# Patient Record
Sex: FEMALE | Race: ASIAN | NOT HISPANIC OR LATINO | Employment: FULL TIME | URBAN - METROPOLITAN AREA
[De-identification: names, ages, dates, MRNs, and addresses within clinical notes are randomized per-mention and may not be internally consistent; named-entity substitution may affect disease eponyms.]

---

## 2017-01-03 ENCOUNTER — AMBULATORY - HEALTHEAST (OUTPATIENT)
Dept: FAMILY MEDICINE | Facility: CLINIC | Age: 52
End: 2017-01-03

## 2017-01-03 DIAGNOSIS — R80.9 MICROALBUMINURIA: ICD-10-CM

## 2017-03-13 ENCOUNTER — OFFICE VISIT - HEALTHEAST (OUTPATIENT)
Dept: FAMILY MEDICINE | Facility: CLINIC | Age: 52
End: 2017-03-13

## 2017-03-13 DIAGNOSIS — E11.9 DIABETES (H): ICD-10-CM

## 2017-03-13 DIAGNOSIS — E11.9 DIABETES MELLITUS (H): ICD-10-CM

## 2017-03-13 DIAGNOSIS — M54.50 LUMBAGO: ICD-10-CM

## 2017-03-13 DIAGNOSIS — E55.9 UNSPECIFIED VITAMIN D DEFICIENCY: ICD-10-CM

## 2017-03-13 DIAGNOSIS — D75.839 THROMBOCYTOSIS: ICD-10-CM

## 2017-03-13 DIAGNOSIS — R20.2 TINGLING IN EXTREMITIES: ICD-10-CM

## 2017-03-13 DIAGNOSIS — R80.9 MICROALBUMINURIA: ICD-10-CM

## 2017-03-13 LAB — HBA1C MFR BLD: 6.2 % (ref 3.5–6)

## 2017-03-13 ASSESSMENT — MIFFLIN-ST. JEOR: SCORE: 1187.48

## 2017-03-22 ENCOUNTER — RECORDS - HEALTHEAST (OUTPATIENT)
Dept: ADMINISTRATIVE | Facility: OTHER | Age: 52
End: 2017-03-22

## 2017-06-22 ENCOUNTER — AMBULATORY - HEALTHEAST (OUTPATIENT)
Dept: LAB | Facility: CLINIC | Age: 52
End: 2017-06-22

## 2017-06-22 DIAGNOSIS — E11.9 DIABETES (H): ICD-10-CM

## 2017-06-22 DIAGNOSIS — R80.9 MICROALBUMINURIA: ICD-10-CM

## 2017-06-22 DIAGNOSIS — E11.9 DIABETES MELLITUS (H): ICD-10-CM

## 2017-06-22 LAB
CHOLEST SERPL-MCNC: 209 MG/DL
FASTING STATUS PATIENT QL REPORTED: NO
HBA1C MFR BLD: 7.6 % (ref 3.5–6)
HDLC SERPL-MCNC: 31 MG/DL
LDLC SERPL CALC-MCNC: 72 MG/DL
LDLC SERPL CALC-MCNC: ABNORMAL MG/DL
TRIGL SERPL-MCNC: 820 MG/DL

## 2017-06-29 ENCOUNTER — OFFICE VISIT - HEALTHEAST (OUTPATIENT)
Dept: FAMILY MEDICINE | Facility: CLINIC | Age: 52
End: 2017-06-29

## 2017-06-29 DIAGNOSIS — E55.9 UNSPECIFIED VITAMIN D DEFICIENCY: ICD-10-CM

## 2017-06-29 DIAGNOSIS — E11.9 DIABETES (H): ICD-10-CM

## 2017-06-29 DIAGNOSIS — R80.9 MICROALBUMINURIA: ICD-10-CM

## 2017-06-29 ASSESSMENT — MIFFLIN-ST. JEOR: SCORE: 1159.47

## 2017-08-31 ENCOUNTER — OFFICE VISIT - HEALTHEAST (OUTPATIENT)
Dept: FAMILY MEDICINE | Facility: CLINIC | Age: 52
End: 2017-08-31

## 2017-08-31 DIAGNOSIS — Z23 NEED FOR IMMUNIZATION AGAINST INFLUENZA: ICD-10-CM

## 2017-08-31 DIAGNOSIS — R20.2 TINGLING IN EXTREMITIES: ICD-10-CM

## 2017-08-31 DIAGNOSIS — I83.90 VARICOSE VEIN OF LEG: ICD-10-CM

## 2017-08-31 DIAGNOSIS — E11.9 DIABETES MELLITUS (H): ICD-10-CM

## 2017-08-31 DIAGNOSIS — E55.9 UNSPECIFIED VITAMIN D DEFICIENCY: ICD-10-CM

## 2017-08-31 DIAGNOSIS — R51.9 HEADACHE: ICD-10-CM

## 2017-08-31 DIAGNOSIS — Z00.00 ANNUAL PHYSICAL EXAM: ICD-10-CM

## 2017-08-31 ASSESSMENT — MIFFLIN-ST. JEOR: SCORE: 1161.29

## 2017-09-06 ENCOUNTER — AMBULATORY - HEALTHEAST (OUTPATIENT)
Dept: EDUCATION SERVICES | Facility: CLINIC | Age: 52
End: 2017-09-06

## 2017-09-06 DIAGNOSIS — E11.9 CONTROLLED TYPE 2 DIABETES MELLITUS WITHOUT COMPLICATION, WITHOUT LONG-TERM CURRENT USE OF INSULIN (H): ICD-10-CM

## 2017-10-09 ENCOUNTER — OFFICE VISIT - HEALTHEAST (OUTPATIENT)
Dept: FAMILY MEDICINE | Facility: CLINIC | Age: 52
End: 2017-10-09

## 2017-10-09 DIAGNOSIS — E55.9 VITAMIN D DEFICIENCY: ICD-10-CM

## 2017-10-09 DIAGNOSIS — E11.9 DIABETES MELLITUS (H): ICD-10-CM

## 2017-10-09 LAB — HBA1C MFR BLD: 7.9 % (ref 3.5–6)

## 2017-10-09 ASSESSMENT — MIFFLIN-ST. JEOR: SCORE: 1158.56

## 2017-10-10 ENCOUNTER — AMBULATORY - HEALTHEAST (OUTPATIENT)
Dept: FAMILY MEDICINE | Facility: CLINIC | Age: 52
End: 2017-10-10

## 2017-10-10 DIAGNOSIS — E55.9 VITAMIN D DEFICIENCY: ICD-10-CM

## 2017-10-11 ENCOUNTER — COMMUNICATION - HEALTHEAST (OUTPATIENT)
Dept: FAMILY MEDICINE | Facility: CLINIC | Age: 52
End: 2017-10-11

## 2017-10-16 ENCOUNTER — COMMUNICATION - HEALTHEAST (OUTPATIENT)
Dept: FAMILY MEDICINE | Facility: CLINIC | Age: 52
End: 2017-10-16

## 2018-01-11 ENCOUNTER — OFFICE VISIT - HEALTHEAST (OUTPATIENT)
Dept: FAMILY MEDICINE | Facility: CLINIC | Age: 53
End: 2018-01-11

## 2018-01-11 DIAGNOSIS — R51.9 HEADACHE: ICD-10-CM

## 2018-01-11 DIAGNOSIS — I10 HTN (HYPERTENSION): ICD-10-CM

## 2018-01-11 DIAGNOSIS — L85.3 DRY SKIN: ICD-10-CM

## 2018-01-11 DIAGNOSIS — E55.9 VITAMIN D DEFICIENCY: ICD-10-CM

## 2018-01-11 DIAGNOSIS — E11.9 DIABETES (H): ICD-10-CM

## 2018-01-11 LAB
ALBUMIN SERPL-MCNC: 4.3 G/DL (ref 3.5–5)
ALP SERPL-CCNC: 54 U/L (ref 45–120)
ALT SERPL W P-5'-P-CCNC: 30 U/L (ref 0–45)
ANION GAP SERPL CALCULATED.3IONS-SCNC: 14 MMOL/L (ref 5–18)
AST SERPL W P-5'-P-CCNC: 19 U/L (ref 0–40)
BILIRUB SERPL-MCNC: 1 MG/DL (ref 0–1)
BUN SERPL-MCNC: 20 MG/DL (ref 8–22)
CALCIUM SERPL-MCNC: 10.2 MG/DL (ref 8.5–10.5)
CHLORIDE BLD-SCNC: 102 MMOL/L (ref 98–107)
CO2 SERPL-SCNC: 23 MMOL/L (ref 22–31)
CREAT SERPL-MCNC: 0.77 MG/DL (ref 0.6–1.1)
GFR SERPL CREATININE-BSD FRML MDRD: >60 ML/MIN/1.73M2
GLUCOSE BLD-MCNC: 181 MG/DL (ref 70–125)
HBA1C MFR BLD: 6.8 % (ref 3.5–6)
POTASSIUM BLD-SCNC: 4.2 MMOL/L (ref 3.5–5)
PROT SERPL-MCNC: 7.9 G/DL (ref 6–8)
SODIUM SERPL-SCNC: 139 MMOL/L (ref 136–145)

## 2018-01-11 ASSESSMENT — MIFFLIN-ST. JEOR: SCORE: 1152.67

## 2018-01-12 LAB — 25(OH)D3 SERPL-MCNC: 45.7 NG/ML (ref 30–80)

## 2018-02-15 ENCOUNTER — OFFICE VISIT - HEALTHEAST (OUTPATIENT)
Dept: NURSING | Facility: CLINIC | Age: 53
End: 2018-02-15

## 2018-02-15 DIAGNOSIS — E55.9 VITAMIN D DEFICIENCY: ICD-10-CM

## 2018-02-15 DIAGNOSIS — E11.9 TYPE 2 DIABETES MELLITUS WITHOUT COMPLICATION, WITHOUT LONG-TERM CURRENT USE OF INSULIN (H): ICD-10-CM

## 2018-02-15 DIAGNOSIS — I10 ESSENTIAL HYPERTENSION WITH GOAL BLOOD PRESSURE LESS THAN 140/90: ICD-10-CM

## 2018-04-25 ENCOUNTER — OFFICE VISIT - HEALTHEAST (OUTPATIENT)
Dept: FAMILY MEDICINE | Facility: CLINIC | Age: 53
End: 2018-04-25

## 2018-04-25 DIAGNOSIS — M54.50 ACUTE LEFT-SIDED LOW BACK PAIN WITHOUT SCIATICA: ICD-10-CM

## 2018-04-25 DIAGNOSIS — R30.0 DYSURIA: ICD-10-CM

## 2018-04-25 LAB
ALBUMIN UR-MCNC: NEGATIVE MG/DL
APPEARANCE UR: CLEAR
BILIRUB UR QL STRIP: NEGATIVE
COLOR UR AUTO: YELLOW
GLUCOSE UR STRIP-MCNC: NEGATIVE MG/DL
HGB UR QL STRIP: NEGATIVE
KETONES UR STRIP-MCNC: NEGATIVE MG/DL
LEUKOCYTE ESTERASE UR QL STRIP: NEGATIVE
NITRATE UR QL: NEGATIVE
PH UR STRIP: 5.5 [PH] (ref 5–8)
SP GR UR STRIP: 1.02 (ref 1–1.03)
UROBILINOGEN UR STRIP-ACNC: NORMAL

## 2018-04-25 ASSESSMENT — MIFFLIN-ST. JEOR: SCORE: 1152.4

## 2018-05-15 ENCOUNTER — OFFICE VISIT - HEALTHEAST (OUTPATIENT)
Dept: FAMILY MEDICINE | Facility: CLINIC | Age: 53
End: 2018-05-15

## 2018-05-15 DIAGNOSIS — R80.9 MICROALBUMINURIA: ICD-10-CM

## 2018-05-15 DIAGNOSIS — Z12.31 VISIT FOR SCREENING MAMMOGRAM: ICD-10-CM

## 2018-05-15 DIAGNOSIS — E08.29 DIABETES MELLITUS DUE TO UNDERLYING CONDITION WITH MICROALBUMINURIA, WITHOUT LONG-TERM CURRENT USE OF INSULIN (H): ICD-10-CM

## 2018-05-15 DIAGNOSIS — T46.4X5A COUGH DUE TO ACE INHIBITOR: ICD-10-CM

## 2018-05-15 DIAGNOSIS — E11.9 DIABETES (H): ICD-10-CM

## 2018-05-15 DIAGNOSIS — R05.8 COUGH DUE TO ACE INHIBITOR: ICD-10-CM

## 2018-05-15 DIAGNOSIS — R80.9 DIABETES MELLITUS DUE TO UNDERLYING CONDITION WITH MICROALBUMINURIA, WITHOUT LONG-TERM CURRENT USE OF INSULIN (H): ICD-10-CM

## 2018-05-15 DIAGNOSIS — I10 HTN (HYPERTENSION): ICD-10-CM

## 2018-05-15 LAB — HBA1C MFR BLD: 7.2 % (ref 3.5–6)

## 2018-05-15 ASSESSMENT — MIFFLIN-ST. JEOR: SCORE: 1152.22

## 2018-06-01 ENCOUNTER — RECORDS - HEALTHEAST (OUTPATIENT)
Dept: ADMINISTRATIVE | Facility: OTHER | Age: 53
End: 2018-06-01

## 2018-08-02 ENCOUNTER — OFFICE VISIT - HEALTHEAST (OUTPATIENT)
Dept: FAMILY MEDICINE | Facility: CLINIC | Age: 53
End: 2018-08-02

## 2018-08-02 ENCOUNTER — RECORDS - HEALTHEAST (OUTPATIENT)
Dept: MAMMOGRAPHY | Facility: CLINIC | Age: 53
End: 2018-08-02

## 2018-08-02 DIAGNOSIS — Z12.31 ENCOUNTER FOR SCREENING MAMMOGRAM FOR MALIGNANT NEOPLASM OF BREAST: ICD-10-CM

## 2018-08-02 DIAGNOSIS — Z00.00 ANNUAL PHYSICAL EXAM: ICD-10-CM

## 2018-08-02 DIAGNOSIS — R05.8 COUGH DUE TO ACE INHIBITOR: ICD-10-CM

## 2018-08-02 DIAGNOSIS — E08.29 DIABETES MELLITUS DUE TO UNDERLYING CONDITION WITH MICROALBUMINURIA, WITHOUT LONG-TERM CURRENT USE OF INSULIN (H): ICD-10-CM

## 2018-08-02 DIAGNOSIS — R80.9 MICROALBUMINURIA: ICD-10-CM

## 2018-08-02 DIAGNOSIS — Z12.4 CERVICAL CANCER SCREENING: ICD-10-CM

## 2018-08-02 DIAGNOSIS — E55.9 VITAMIN D DEFICIENCY: ICD-10-CM

## 2018-08-02 DIAGNOSIS — I10 HTN (HYPERTENSION): ICD-10-CM

## 2018-08-02 DIAGNOSIS — D75.839 THROMBOCYTOSIS: ICD-10-CM

## 2018-08-02 DIAGNOSIS — R80.9 DIABETES MELLITUS DUE TO UNDERLYING CONDITION WITH MICROALBUMINURIA, WITHOUT LONG-TERM CURRENT USE OF INSULIN (H): ICD-10-CM

## 2018-08-02 DIAGNOSIS — T46.4X5A COUGH DUE TO ACE INHIBITOR: ICD-10-CM

## 2018-08-02 DIAGNOSIS — R51.9 HEADACHE: ICD-10-CM

## 2018-08-02 LAB
CHOLEST SERPL-MCNC: 204 MG/DL
CREAT UR-MCNC: 64.1 MG/DL
ERYTHROCYTE [DISTWIDTH] IN BLOOD BY AUTOMATED COUNT: 12.5 % (ref 11–14.5)
FASTING STATUS PATIENT QL REPORTED: NO
HCT VFR BLD AUTO: 46.8 % (ref 35–47)
HDLC SERPL-MCNC: 45 MG/DL
HGB BLD-MCNC: 16.1 G/DL (ref 12–16)
LDLC SERPL CALC-MCNC: 90 MG/DL
MCH RBC QN AUTO: 28.4 PG (ref 27–34)
MCHC RBC AUTO-ENTMCNC: 34.4 G/DL (ref 32–36)
MCV RBC AUTO: 83 FL (ref 80–100)
MICROALBUMIN UR-MCNC: 0.58 MG/DL (ref 0–1.99)
MICROALBUMIN/CREAT UR: 9 MG/G
PLATELET # BLD AUTO: 171 THOU/UL (ref 140–440)
PMV BLD AUTO: 9.1 FL (ref 7–10)
RBC # BLD AUTO: 5.66 MILL/UL (ref 3.8–5.4)
TRIGL SERPL-MCNC: 347 MG/DL
WBC: 5.3 THOU/UL (ref 4–11)

## 2018-08-02 ASSESSMENT — MIFFLIN-ST. JEOR: SCORE: 1154.99

## 2018-08-03 LAB
25(OH)D3 SERPL-MCNC: 21.9 NG/ML (ref 30–80)
HPV SOURCE: NORMAL
HUMAN PAPILLOMA VIRUS 16 DNA: NEGATIVE
HUMAN PAPILLOMA VIRUS 18 DNA: NEGATIVE
HUMAN PAPILLOMA VIRUS FINAL DIAGNOSIS: NORMAL
HUMAN PAPILLOMA VIRUS OTHER HR: NEGATIVE
SPECIMEN DESCRIPTION: NORMAL

## 2018-08-06 ENCOUNTER — AMBULATORY - HEALTHEAST (OUTPATIENT)
Dept: FAMILY MEDICINE | Facility: CLINIC | Age: 53
End: 2018-08-06

## 2018-08-06 DIAGNOSIS — E55.9 VITAMIN D DEFICIENCY: ICD-10-CM

## 2018-08-08 LAB
BKR LAB AP ABNORMAL BLEEDING: NO
BKR LAB AP BIRTH CONTROL/HORMONES: NORMAL
BKR LAB AP CERVICAL APPEARANCE: NORMAL
BKR LAB AP GYN ADEQUACY: NORMAL
BKR LAB AP GYN INTERPRETATION: NORMAL
BKR LAB AP HPV REFLEX: NORMAL
BKR LAB AP LMP: NORMAL
BKR LAB AP PATIENT STATUS: NORMAL
BKR LAB AP PREVIOUS ABNORMAL: NORMAL
BKR LAB AP PREVIOUS NORMAL: 2013
HIGH RISK?: NO
PATH REPORT.COMMENTS IMP SPEC: NORMAL
RESULT FLAG (HE HISTORICAL CONVERSION): NORMAL

## 2018-09-28 ENCOUNTER — COMMUNICATION - HEALTHEAST (OUTPATIENT)
Dept: FAMILY MEDICINE | Facility: CLINIC | Age: 53
End: 2018-09-28

## 2018-09-28 DIAGNOSIS — E11.9 CONTROLLED TYPE 2 DIABETES MELLITUS WITHOUT COMPLICATION, WITHOUT LONG-TERM CURRENT USE OF INSULIN (H): ICD-10-CM

## 2018-10-03 ENCOUNTER — OFFICE VISIT - HEALTHEAST (OUTPATIENT)
Dept: FAMILY MEDICINE | Facility: CLINIC | Age: 53
End: 2018-10-03

## 2018-10-03 DIAGNOSIS — Z23 NEED FOR IMMUNIZATION AGAINST INFLUENZA: ICD-10-CM

## 2018-10-03 DIAGNOSIS — M67.439 GANGLION CYST OF WRIST, UNSPECIFIED LATERALITY: ICD-10-CM

## 2018-10-03 ASSESSMENT — MIFFLIN-ST. JEOR: SCORE: 1168.66

## 2018-11-02 ENCOUNTER — OFFICE VISIT - HEALTHEAST (OUTPATIENT)
Dept: FAMILY MEDICINE | Facility: CLINIC | Age: 53
End: 2018-11-02

## 2018-11-02 DIAGNOSIS — M54.2 NECK PAIN: ICD-10-CM

## 2018-11-02 DIAGNOSIS — M94.0 COSTOCHONDRITIS, ACUTE: ICD-10-CM

## 2018-11-06 ENCOUNTER — OFFICE VISIT - HEALTHEAST (OUTPATIENT)
Dept: FAMILY MEDICINE | Facility: CLINIC | Age: 53
End: 2018-11-06

## 2018-11-06 DIAGNOSIS — I10 HTN (HYPERTENSION): ICD-10-CM

## 2018-11-06 DIAGNOSIS — L85.3 DRY SKIN: ICD-10-CM

## 2018-11-06 DIAGNOSIS — R80.9 MICROALBUMINURIA: ICD-10-CM

## 2018-11-06 DIAGNOSIS — E55.9 VITAMIN D DEFICIENCY: ICD-10-CM

## 2018-11-06 DIAGNOSIS — E11.9 DIABETES (H): ICD-10-CM

## 2018-11-06 DIAGNOSIS — R07.89 CHEST WALL PAIN: ICD-10-CM

## 2018-11-06 LAB — HBA1C MFR BLD: 7.6 % (ref 3.5–6)

## 2018-11-06 ASSESSMENT — MIFFLIN-ST. JEOR: SCORE: 1170.25

## 2018-11-30 ENCOUNTER — COMMUNICATION - HEALTHEAST (OUTPATIENT)
Dept: FAMILY MEDICINE | Facility: CLINIC | Age: 53
End: 2018-11-30

## 2018-11-30 DIAGNOSIS — E11.9 DIABETES MELLITUS (H): ICD-10-CM

## 2019-01-16 ENCOUNTER — COMMUNICATION - HEALTHEAST (OUTPATIENT)
Dept: FAMILY MEDICINE | Facility: CLINIC | Age: 54
End: 2019-01-16

## 2019-01-16 DIAGNOSIS — R51.9 HEADACHE: ICD-10-CM

## 2019-01-25 ENCOUNTER — COMMUNICATION - HEALTHEAST (OUTPATIENT)
Dept: FAMILY MEDICINE | Facility: CLINIC | Age: 54
End: 2019-01-25

## 2019-01-25 DIAGNOSIS — E11.9 CONTROLLED TYPE 2 DIABETES MELLITUS WITHOUT COMPLICATION, WITHOUT LONG-TERM CURRENT USE OF INSULIN (H): ICD-10-CM

## 2019-02-13 ENCOUNTER — COMMUNICATION - HEALTHEAST (OUTPATIENT)
Dept: FAMILY MEDICINE | Facility: CLINIC | Age: 54
End: 2019-02-13

## 2019-03-07 ENCOUNTER — OFFICE VISIT - HEALTHEAST (OUTPATIENT)
Dept: FAMILY MEDICINE | Facility: CLINIC | Age: 54
End: 2019-03-07

## 2019-03-07 DIAGNOSIS — E11.9 DIABETES MELLITUS (H): ICD-10-CM

## 2019-03-07 DIAGNOSIS — Z60.3 LANGUAGE BARRIER AFFECTING HEALTH CARE: ICD-10-CM

## 2019-03-07 DIAGNOSIS — Z75.8 LANGUAGE BARRIER AFFECTING HEALTH CARE: ICD-10-CM

## 2019-03-07 DIAGNOSIS — R80.9 CONTROLLED TYPE 2 DIABETES MELLITUS WITH MICROALBUMINURIA, WITHOUT LONG-TERM CURRENT USE OF INSULIN (H): ICD-10-CM

## 2019-03-07 DIAGNOSIS — E55.9 VITAMIN D DEFICIENCY: ICD-10-CM

## 2019-03-07 DIAGNOSIS — I10 ESSENTIAL HYPERTENSION: ICD-10-CM

## 2019-03-07 DIAGNOSIS — Z86.2 HX OF POLYCYTHEMIA: ICD-10-CM

## 2019-03-07 DIAGNOSIS — E11.29 CONTROLLED TYPE 2 DIABETES MELLITUS WITH MICROALBUMINURIA, WITHOUT LONG-TERM CURRENT USE OF INSULIN (H): ICD-10-CM

## 2019-03-07 DIAGNOSIS — R80.9 MICROALBUMINURIA: ICD-10-CM

## 2019-03-07 LAB
BASOPHILS # BLD AUTO: 0 THOU/UL (ref 0–0.2)
BASOPHILS NFR BLD AUTO: 1 % (ref 0–2)
EOSINOPHIL # BLD AUTO: 0.1 THOU/UL (ref 0–0.4)
EOSINOPHIL NFR BLD AUTO: 2 % (ref 0–6)
ERYTHROCYTE [DISTWIDTH] IN BLOOD BY AUTOMATED COUNT: 12.6 % (ref 11–14.5)
HBA1C MFR BLD: 7.9 % (ref 3.5–6)
HCT VFR BLD AUTO: 44 % (ref 35–47)
HGB BLD-MCNC: 14.8 G/DL (ref 12–16)
LYMPHOCYTES # BLD AUTO: 1.2 THOU/UL (ref 0.8–4.4)
LYMPHOCYTES NFR BLD AUTO: 22 % (ref 20–40)
MCH RBC QN AUTO: 28.2 PG (ref 27–34)
MCHC RBC AUTO-ENTMCNC: 33.7 G/DL (ref 32–36)
MCV RBC AUTO: 84 FL (ref 80–100)
MONOCYTES # BLD AUTO: 0.3 THOU/UL (ref 0–0.9)
MONOCYTES NFR BLD AUTO: 6 % (ref 2–10)
NEUTROPHILS # BLD AUTO: 3.8 THOU/UL (ref 2–7.7)
NEUTROPHILS NFR BLD AUTO: 69 % (ref 50–70)
PLATELET # BLD AUTO: 188 THOU/UL (ref 140–440)
PMV BLD AUTO: 8.6 FL (ref 7–10)
RBC # BLD AUTO: 5.26 MILL/UL (ref 3.8–5.4)
WBC: 5.5 THOU/UL (ref 4–11)

## 2019-03-07 RX ORDER — LANCETS 33 GAUGE
EACH MISCELLANEOUS
Status: SHIPPED | COMMUNITY
Start: 2019-01-28 | End: 2022-07-25

## 2019-03-07 SDOH — SOCIAL STABILITY - SOCIAL INSECURITY: ACCULTURATION DIFFICULTY: Z60.3

## 2019-03-07 ASSESSMENT — MIFFLIN-ST. JEOR: SCORE: 1163.55

## 2019-03-08 LAB — 25(OH)D3 SERPL-MCNC: 40.6 NG/ML (ref 30–80)

## 2019-04-06 ENCOUNTER — OFFICE VISIT - HEALTHEAST (OUTPATIENT)
Dept: FAMILY MEDICINE | Facility: CLINIC | Age: 54
End: 2019-04-06

## 2019-04-06 DIAGNOSIS — R51.9 NONINTRACTABLE EPISODIC HEADACHE, UNSPECIFIED HEADACHE TYPE: ICD-10-CM

## 2019-04-06 DIAGNOSIS — B34.9 VIRAL SYNDROME: ICD-10-CM

## 2019-04-06 DIAGNOSIS — R73.9 HYPERGLYCEMIA: ICD-10-CM

## 2019-04-06 LAB
ANION GAP SERPL CALCULATED.3IONS-SCNC: 14 MMOL/L (ref 5–18)
BASOPHILS # BLD AUTO: 0 THOU/UL (ref 0–0.2)
BASOPHILS NFR BLD AUTO: 0 % (ref 0–2)
BUN SERPL-MCNC: 15 MG/DL (ref 8–22)
CHLORIDE BLD-SCNC: 102 MMOL/L (ref 98–107)
CO2 SERPL-SCNC: 25 MMOL/L (ref 22–31)
CREAT SERPL-MCNC: 0.6 MG/DL (ref 0.7–1.3)
EOSINOPHIL # BLD AUTO: 0.1 THOU/UL (ref 0–0.4)
EOSINOPHIL NFR BLD AUTO: 3 % (ref 0–6)
ERYTHROCYTE [DISTWIDTH] IN BLOOD BY AUTOMATED COUNT: 12 % (ref 11–14.5)
GLUCOSE BLD-MCNC: 356 MG/DL (ref 70–125)
HCT VFR BLD AUTO: 41.5 % (ref 35–47)
HGB BLD-MCNC: 13.7 G/DL (ref 12–16)
LYMPHOCYTES # BLD AUTO: 0.9 THOU/UL (ref 0.8–4.4)
LYMPHOCYTES NFR BLD AUTO: 20 % (ref 20–40)
MCH RBC QN AUTO: 27.8 PG (ref 27–34)
MCHC RBC AUTO-ENTMCNC: 33 G/DL (ref 32–36)
MCV RBC AUTO: 84 FL (ref 80–100)
MONOCYTES # BLD AUTO: 0.3 THOU/UL (ref 0–0.9)
MONOCYTES NFR BLD AUTO: 7 % (ref 2–10)
NEUTROPHILS # BLD AUTO: 3.1 THOU/UL (ref 2–7.7)
NEUTROPHILS NFR BLD AUTO: 70 % (ref 50–70)
PLATELET # BLD AUTO: 151 THOU/UL (ref 140–440)
PMV BLD AUTO: 8.2 FL (ref 7–10)
POTASSIUM BLD-SCNC: 4 MMOL/L (ref 3.5–5.5)
RBC # BLD AUTO: 4.93 MILL/UL (ref 3.8–5.4)
SODIUM SERPL-SCNC: 141 MMOL/L (ref 136–145)
WBC: 4.5 THOU/UL (ref 4–11)

## 2019-05-20 ENCOUNTER — OFFICE VISIT - HEALTHEAST (OUTPATIENT)
Dept: EDUCATION SERVICES | Facility: CLINIC | Age: 54
End: 2019-05-20

## 2019-05-20 DIAGNOSIS — E11.9 DIABETES (H): ICD-10-CM

## 2019-06-12 ENCOUNTER — AMBULATORY - HEALTHEAST (OUTPATIENT)
Dept: FAMILY MEDICINE | Facility: CLINIC | Age: 54
End: 2019-06-12

## 2019-06-12 DIAGNOSIS — E08.29 DIABETES MELLITUS DUE TO UNDERLYING CONDITION WITH MICROALBUMINURIA, WITHOUT LONG-TERM CURRENT USE OF INSULIN (H): ICD-10-CM

## 2019-06-12 DIAGNOSIS — R80.9 DIABETES MELLITUS DUE TO UNDERLYING CONDITION WITH MICROALBUMINURIA, WITHOUT LONG-TERM CURRENT USE OF INSULIN (H): ICD-10-CM

## 2019-06-17 ENCOUNTER — OFFICE VISIT - HEALTHEAST (OUTPATIENT)
Dept: FAMILY MEDICINE | Facility: CLINIC | Age: 54
End: 2019-06-17

## 2019-06-17 DIAGNOSIS — E11.29 CONTROLLED TYPE 2 DIABETES MELLITUS WITH MICROALBUMINURIA, WITHOUT LONG-TERM CURRENT USE OF INSULIN (H): ICD-10-CM

## 2019-06-17 DIAGNOSIS — D75.839 THROMBOCYTOSIS: ICD-10-CM

## 2019-06-17 DIAGNOSIS — E08.29 DIABETES MELLITUS DUE TO UNDERLYING CONDITION WITH MICROALBUMINURIA, WITHOUT LONG-TERM CURRENT USE OF INSULIN (H): ICD-10-CM

## 2019-06-17 DIAGNOSIS — R80.9 MICROALBUMINURIA: ICD-10-CM

## 2019-06-17 DIAGNOSIS — R80.9 CONTROLLED TYPE 2 DIABETES MELLITUS WITH MICROALBUMINURIA, WITHOUT LONG-TERM CURRENT USE OF INSULIN (H): ICD-10-CM

## 2019-06-17 DIAGNOSIS — E55.9 VITAMIN D DEFICIENCY: ICD-10-CM

## 2019-06-17 DIAGNOSIS — R80.9 DIABETES MELLITUS DUE TO UNDERLYING CONDITION WITH MICROALBUMINURIA, WITHOUT LONG-TERM CURRENT USE OF INSULIN (H): ICD-10-CM

## 2019-06-17 DIAGNOSIS — Z86.2 HX OF POLYCYTHEMIA: ICD-10-CM

## 2019-06-17 LAB — HBA1C MFR BLD: 7 % (ref 3.5–6)

## 2019-06-17 ASSESSMENT — MIFFLIN-ST. JEOR: SCORE: 1143.06

## 2019-08-30 ENCOUNTER — RECORDS - HEALTHEAST (OUTPATIENT)
Dept: ADMINISTRATIVE | Facility: OTHER | Age: 54
End: 2019-08-30

## 2019-09-04 ENCOUNTER — RECORDS - HEALTHEAST (OUTPATIENT)
Dept: HEALTH INFORMATION MANAGEMENT | Facility: CLINIC | Age: 54
End: 2019-09-04

## 2019-09-25 ENCOUNTER — AMBULATORY - HEALTHEAST (OUTPATIENT)
Dept: FAMILY MEDICINE | Facility: CLINIC | Age: 54
End: 2019-09-25

## 2019-09-25 DIAGNOSIS — R80.9 MICROALBUMINURIA: ICD-10-CM

## 2019-09-25 DIAGNOSIS — R80.9 DIABETES MELLITUS DUE TO UNDERLYING CONDITION WITH MICROALBUMINURIA, WITHOUT LONG-TERM CURRENT USE OF INSULIN (H): ICD-10-CM

## 2019-09-25 DIAGNOSIS — I10 ESSENTIAL HYPERTENSION: ICD-10-CM

## 2019-09-25 DIAGNOSIS — E08.29 DIABETES MELLITUS DUE TO UNDERLYING CONDITION WITH MICROALBUMINURIA, WITHOUT LONG-TERM CURRENT USE OF INSULIN (H): ICD-10-CM

## 2019-10-03 ENCOUNTER — OFFICE VISIT - HEALTHEAST (OUTPATIENT)
Dept: FAMILY MEDICINE | Facility: CLINIC | Age: 54
End: 2019-10-03

## 2019-10-03 DIAGNOSIS — I10 ESSENTIAL HYPERTENSION: ICD-10-CM

## 2019-10-03 DIAGNOSIS — E08.29 DIABETES MELLITUS DUE TO UNDERLYING CONDITION WITH MICROALBUMINURIA, WITHOUT LONG-TERM CURRENT USE OF INSULIN (H): ICD-10-CM

## 2019-10-03 DIAGNOSIS — Z23 NEED FOR IMMUNIZATION AGAINST INFLUENZA: ICD-10-CM

## 2019-10-03 DIAGNOSIS — R80.9 MICROALBUMINURIA: ICD-10-CM

## 2019-10-03 DIAGNOSIS — R80.9 DIABETES MELLITUS DUE TO UNDERLYING CONDITION WITH MICROALBUMINURIA, WITHOUT LONG-TERM CURRENT USE OF INSULIN (H): ICD-10-CM

## 2019-10-03 DIAGNOSIS — Z00.00 ANNUAL PHYSICAL EXAM: ICD-10-CM

## 2019-10-03 LAB
ALBUMIN SERPL-MCNC: 4.4 G/DL (ref 3.5–5)
ALP SERPL-CCNC: 77 U/L (ref 45–120)
ALT SERPL W P-5'-P-CCNC: 46 U/L (ref 0–45)
ANION GAP SERPL CALCULATED.3IONS-SCNC: 13 MMOL/L (ref 5–18)
AST SERPL W P-5'-P-CCNC: 24 U/L (ref 0–40)
BILIRUB SERPL-MCNC: 0.5 MG/DL (ref 0–1)
BUN SERPL-MCNC: 20 MG/DL (ref 8–22)
CALCIUM SERPL-MCNC: 9.7 MG/DL (ref 8.5–10.5)
CHLORIDE BLD-SCNC: 104 MMOL/L (ref 98–107)
CHOLEST SERPL-MCNC: 203 MG/DL
CO2 SERPL-SCNC: 23 MMOL/L (ref 22–31)
CREAT SERPL-MCNC: 0.83 MG/DL (ref 0.6–1.1)
CREAT UR-MCNC: 39.9 MG/DL
FASTING STATUS PATIENT QL REPORTED: ABNORMAL
GFR SERPL CREATININE-BSD FRML MDRD: >60 ML/MIN/1.73M2
GLUCOSE BLD-MCNC: 145 MG/DL (ref 70–125)
HBA1C MFR BLD: 7.6 % (ref 3.5–6)
HDLC SERPL-MCNC: 40 MG/DL
LDLC SERPL CALC-MCNC: 98 MG/DL
MICROALBUMIN UR-MCNC: <0.5 MG/DL (ref 0–1.99)
MICROALBUMIN/CREAT UR: NORMAL MG/G{CREAT}
POTASSIUM BLD-SCNC: 4.5 MMOL/L (ref 3.5–5)
PROT SERPL-MCNC: 7.3 G/DL (ref 6–8)
SODIUM SERPL-SCNC: 140 MMOL/L (ref 136–145)
TRIGL SERPL-MCNC: 324 MG/DL

## 2019-10-03 ASSESSMENT — MIFFLIN-ST. JEOR: SCORE: 1161.21

## 2019-10-04 ENCOUNTER — COMMUNICATION - HEALTHEAST (OUTPATIENT)
Dept: FAMILY MEDICINE | Facility: CLINIC | Age: 54
End: 2019-10-04

## 2019-10-04 DIAGNOSIS — E11.9 CONTROLLED TYPE 2 DIABETES MELLITUS WITHOUT COMPLICATION, WITHOUT LONG-TERM CURRENT USE OF INSULIN (H): ICD-10-CM

## 2019-12-05 ENCOUNTER — COMMUNICATION - HEALTHEAST (OUTPATIENT)
Dept: FAMILY MEDICINE | Facility: CLINIC | Age: 54
End: 2019-12-05

## 2019-12-05 DIAGNOSIS — E11.9 DIABETES (H): ICD-10-CM

## 2019-12-05 DIAGNOSIS — R80.9 MICROALBUMINURIA: ICD-10-CM

## 2019-12-05 DIAGNOSIS — I10 HTN (HYPERTENSION): ICD-10-CM

## 2020-01-30 ENCOUNTER — AMBULATORY - HEALTHEAST (OUTPATIENT)
Dept: FAMILY MEDICINE | Facility: CLINIC | Age: 55
End: 2020-01-30

## 2020-01-30 ENCOUNTER — OFFICE VISIT - HEALTHEAST (OUTPATIENT)
Dept: FAMILY MEDICINE | Facility: CLINIC | Age: 55
End: 2020-01-30

## 2020-01-30 DIAGNOSIS — E08.29 DIABETES MELLITUS DUE TO UNDERLYING CONDITION WITH MICROALBUMINURIA, WITHOUT LONG-TERM CURRENT USE OF INSULIN (H): ICD-10-CM

## 2020-01-30 DIAGNOSIS — Z23 NEED FOR PNEUMOCOCCAL VACCINATION: ICD-10-CM

## 2020-01-30 DIAGNOSIS — R80.9 DIABETES MELLITUS DUE TO UNDERLYING CONDITION WITH MICROALBUMINURIA, WITHOUT LONG-TERM CURRENT USE OF INSULIN (H): ICD-10-CM

## 2020-01-30 LAB — HBA1C MFR BLD: 8.4 % (ref 3.5–6)

## 2020-01-30 ASSESSMENT — MIFFLIN-ST. JEOR: SCORE: 1146.12

## 2020-10-03 ENCOUNTER — COMMUNICATION - HEALTHEAST (OUTPATIENT)
Dept: FAMILY MEDICINE | Facility: CLINIC | Age: 55
End: 2020-10-03

## 2020-10-03 DIAGNOSIS — E11.9 CONTROLLED TYPE 2 DIABETES MELLITUS WITHOUT COMPLICATION, WITHOUT LONG-TERM CURRENT USE OF INSULIN (H): ICD-10-CM

## 2020-11-24 ENCOUNTER — COMMUNICATION - HEALTHEAST (OUTPATIENT)
Dept: FAMILY MEDICINE | Facility: CLINIC | Age: 55
End: 2020-11-24

## 2020-11-24 DIAGNOSIS — E11.9 CONTROLLED TYPE 2 DIABETES MELLITUS WITHOUT COMPLICATION, WITHOUT LONG-TERM CURRENT USE OF INSULIN (H): ICD-10-CM

## 2020-11-25 RX ORDER — BLOOD SUGAR DIAGNOSTIC
STRIP MISCELLANEOUS
Qty: 50 STRIP | Refills: 15 | Status: SHIPPED | OUTPATIENT
Start: 2020-11-25 | End: 2022-07-24

## 2020-12-11 ENCOUNTER — AMBULATORY - HEALTHEAST (OUTPATIENT)
Dept: FAMILY MEDICINE | Facility: CLINIC | Age: 55
End: 2020-12-11

## 2020-12-11 DIAGNOSIS — E08.29 DIABETES MELLITUS DUE TO UNDERLYING CONDITION WITH MICROALBUMINURIA, WITHOUT LONG-TERM CURRENT USE OF INSULIN (H): ICD-10-CM

## 2020-12-11 DIAGNOSIS — R80.9 DIABETES MELLITUS DUE TO UNDERLYING CONDITION WITH MICROALBUMINURIA, WITHOUT LONG-TERM CURRENT USE OF INSULIN (H): ICD-10-CM

## 2020-12-14 ENCOUNTER — OFFICE VISIT - HEALTHEAST (OUTPATIENT)
Dept: FAMILY MEDICINE | Facility: CLINIC | Age: 55
End: 2020-12-14

## 2020-12-14 DIAGNOSIS — R51.9 HEADACHE: ICD-10-CM

## 2020-12-14 DIAGNOSIS — R80.9 DIABETES MELLITUS DUE TO UNDERLYING CONDITION WITH MICROALBUMINURIA, WITHOUT LONG-TERM CURRENT USE OF INSULIN (H): ICD-10-CM

## 2020-12-14 DIAGNOSIS — E08.29 DIABETES MELLITUS DUE TO UNDERLYING CONDITION WITH MICROALBUMINURIA, WITHOUT LONG-TERM CURRENT USE OF INSULIN (H): ICD-10-CM

## 2020-12-14 DIAGNOSIS — R80.9 MICROALBUMINURIA: ICD-10-CM

## 2020-12-14 DIAGNOSIS — I10 HTN (HYPERTENSION): ICD-10-CM

## 2020-12-14 DIAGNOSIS — E11.9 DIABETES (H): ICD-10-CM

## 2020-12-14 LAB
ALBUMIN SERPL-MCNC: 4.7 G/DL (ref 3.5–5)
ALP SERPL-CCNC: 55 U/L (ref 45–120)
ALT SERPL W P-5'-P-CCNC: 29 U/L (ref 0–45)
ANION GAP SERPL CALCULATED.3IONS-SCNC: 11 MMOL/L (ref 5–18)
AST SERPL W P-5'-P-CCNC: 15 U/L (ref 0–40)
BILIRUB SERPL-MCNC: 0.7 MG/DL (ref 0–1)
BUN SERPL-MCNC: 13 MG/DL (ref 8–22)
CALCIUM SERPL-MCNC: 9.6 MG/DL (ref 8.5–10.5)
CHLORIDE BLD-SCNC: 102 MMOL/L (ref 98–107)
CHOLEST SERPL-MCNC: 196 MG/DL
CO2 SERPL-SCNC: 26 MMOL/L (ref 22–31)
CREAT SERPL-MCNC: 0.69 MG/DL (ref 0.6–1.1)
CREAT UR-MCNC: 8.6 MG/DL
FASTING STATUS PATIENT QL REPORTED: ABNORMAL
GFR SERPL CREATININE-BSD FRML MDRD: >60 ML/MIN/1.73M2
GLUCOSE BLD-MCNC: 136 MG/DL (ref 70–125)
HBA1C MFR BLD: 8.8 %
HDLC SERPL-MCNC: 44 MG/DL
LDLC SERPL CALC-MCNC: 97 MG/DL
MICROALBUMIN UR-MCNC: <0.5 MG/DL (ref 0–1.99)
MICROALBUMIN/CREAT UR: NORMAL MG/G{CREAT}
POTASSIUM BLD-SCNC: 4.6 MMOL/L (ref 3.5–5)
PROT SERPL-MCNC: 7.7 G/DL (ref 6–8)
SODIUM SERPL-SCNC: 139 MMOL/L (ref 136–145)
TRIGL SERPL-MCNC: 277 MG/DL

## 2020-12-14 RX ORDER — ACETAMINOPHEN 500 MG
TABLET ORAL
Qty: 90 TABLET | Refills: 5 | Status: SHIPPED | OUTPATIENT
Start: 2020-12-14 | End: 2022-08-22

## 2020-12-14 RX ORDER — LOSARTAN POTASSIUM 100 MG/1
100 TABLET ORAL DAILY
Qty: 90 TABLET | Refills: 3 | Status: SHIPPED | OUTPATIENT
Start: 2020-12-14 | End: 2022-02-22

## 2020-12-14 ASSESSMENT — MIFFLIN-ST. JEOR: SCORE: 1135.35

## 2021-03-15 ENCOUNTER — RECORDS - HEALTHEAST (OUTPATIENT)
Dept: MAMMOGRAPHY | Facility: CLINIC | Age: 56
End: 2021-03-15

## 2021-03-15 ENCOUNTER — OFFICE VISIT - HEALTHEAST (OUTPATIENT)
Dept: FAMILY MEDICINE | Facility: CLINIC | Age: 56
End: 2021-03-15

## 2021-03-15 ENCOUNTER — AMBULATORY - HEALTHEAST (OUTPATIENT)
Dept: FAMILY MEDICINE | Facility: CLINIC | Age: 56
End: 2021-03-15

## 2021-03-15 DIAGNOSIS — Z23 NEED FOR TD VACCINE: ICD-10-CM

## 2021-03-15 DIAGNOSIS — R20.9 DISTURBANCE OF SKIN SENSATION: ICD-10-CM

## 2021-03-15 DIAGNOSIS — R80.9 DIABETES MELLITUS DUE TO UNDERLYING CONDITION WITH MICROALBUMINURIA, WITHOUT LONG-TERM CURRENT USE OF INSULIN (H): ICD-10-CM

## 2021-03-15 DIAGNOSIS — Z00.00 ANNUAL PHYSICAL EXAM: ICD-10-CM

## 2021-03-15 DIAGNOSIS — Z79.899 POLYPHARMACY: ICD-10-CM

## 2021-03-15 DIAGNOSIS — Z12.31 ENCOUNTER FOR SCREENING MAMMOGRAM FOR MALIGNANT NEOPLASM OF BREAST: ICD-10-CM

## 2021-03-15 DIAGNOSIS — E08.29 DIABETES MELLITUS DUE TO UNDERLYING CONDITION WITH MICROALBUMINURIA, WITHOUT LONG-TERM CURRENT USE OF INSULIN (H): ICD-10-CM

## 2021-03-15 DIAGNOSIS — D12.6 TUBULAR ADENOMA OF COLON: ICD-10-CM

## 2021-03-15 DIAGNOSIS — Z60.3 LANGUAGE BARRIER AFFECTING HEALTH CARE: ICD-10-CM

## 2021-03-15 DIAGNOSIS — E11.65 POORLY CONTROLLED TYPE 2 DIABETES MELLITUS (H): ICD-10-CM

## 2021-03-15 DIAGNOSIS — Z75.8 LANGUAGE BARRIER AFFECTING HEALTH CARE: ICD-10-CM

## 2021-03-15 LAB — HBA1C MFR BLD: 8.3 %

## 2021-03-15 RX ORDER — METFORMIN HYDROCHLORIDE 750 MG/1
1500 TABLET, EXTENDED RELEASE ORAL DAILY
Qty: 60 TABLET | Refills: 11 | Status: SHIPPED | OUTPATIENT
Start: 2021-03-15 | End: 2022-02-22

## 2021-03-15 SDOH — SOCIAL STABILITY - SOCIAL INSECURITY: ACCULTURATION DIFFICULTY: Z60.3

## 2021-03-15 ASSESSMENT — MIFFLIN-ST. JEOR: SCORE: 1113.53

## 2021-05-27 NOTE — PROGRESS NOTES
"WALK IN CARE - VISIT NOTE    HPI    Pa Bill is a 53 y.o. female brought in by self and family member presenting for evaluation of headache:    Patient declined an  and wanted to use family member    Fever started Wednesday (3d ago)  She does not have a thermometer but was feeling cold  Did start to get a headache the same day  Stabbing pain all throughout her head  Rates it as a 4/10  Has not tried any medications for this  She is having some light sensitivity  No changes to vision  No ear pressure or pain  Has been checking her blood sugars and they have been \"very well controlled\" but no numbers   No N/V/D  No urinary symptoms (frequency urgency burning), no blood    Social History     Tobacco Use     Smoking status: Former Smoker     Last attempt to quit: 1998     Years since quittin.2     Smokeless tobacco: Former User   Substance Use Topics     Alcohol use: No     Drug use: No     PMH of diabetes.     ROS  Complete ROS negative except as noted in HPI.    OBJECTIVE  Vitals:    19 1249   BP: 102/64   Pulse: 74   Resp: 18   Temp: 98.1  F (36.7  C)   SpO2: 98%       Physical Exam  General: No acute distress, sitting comfortable in chair, able to walk to exam table  Eyes: EOMI, PERRLA, normal conjunctiva, normal sclera, no sensitivity to light on exam  Ears: ear canals patent, TM normal, external ears normal  Nose: moist mucosa, no rhinorrhea  Oral: moist oral mucosa, no tonsillar exudates, no erythema, extremely poor dentition  Lymph: no lymphadenopathy  Neck: good ROM, supple  CV: RRR, distal and peripheral pulses in tact  Resp: CTA bilaterally, no wheezing, no rhonchi, no rhales, no respiratory distress  Abdomen: soft, non-tender, normal bowel sounds  Neuro: moves all 4 extremities, no focal deficits noted  Extrem: no edema, no cyanosis, well-perfused    Labs    Results for orders placed or performed in visit on 19   ISTAT CHEM 7 ([facility] ONLY)   Result Value Ref Range    Sodium " 141 136 - 145 mmol/L    Potassium 4.0 3.5 - 5.5 mmol/L    CO2 25 22 - 31 mmol/L    Chloride 102 98 - 107 mmol/L    Anion Gap, Calculation 14 5 - 18 mmol/L    Glucose 356 (H) 70 - 125 mg/dL    BUN 15 8 - 22 mg/dL    Creatinine 0.60 (L) 0.70 - 1.30 mg/dL   HM1 (CBC with Diff)   Result Value Ref Range    WBC 4.5 4.0 - 11.0 thou/uL    RBC 4.93 3.80 - 5.40 mill/uL    Hemoglobin 13.7 12.0 - 16.0 g/dL    Hematocrit 41.5 35.0 - 47.0 %    MCV 84 80 - 100 fL    MCH 27.8 27.0 - 34.0 pg    MCHC 33.0 32.0 - 36.0 g/dL    RDW 12.0 11.0 - 14.5 %    Platelets 151 140 - 440 thou/uL    MPV 8.2 7.0 - 10.0 fL    Neutrophils % 70 50 - 70 %    Lymphocytes % 20 20 - 40 %    Monocytes % 7 2 - 10 %    Eosinophils % 3 0 - 6 %    Basophils % 0 0 - 2 %    Neutrophils Absolute 3.1 2.0 - 7.7 thou/uL    Lymphocytes Absolute 0.9 0.8 - 4.4 thou/uL    Monocytes Absolute 0.3 0.0 - 0.9 thou/uL    Eosinophils Absolute 0.1 0.0 - 0.4 thou/uL    Basophils Absolute 0.0 0.0 - 0.2 thou/uL       ASSESSMENT/PLAN  1. Nonintractable episodic headache, unspecified headache type  2. Viral syndrome  3. Hyperglycemia  Likely vague symptoms and headache are due to hyperglycemia given i-STAT shows a glucose to 356.  There is a significant language barrier, but she tells me her sugars are well controlled.  No elevated white count making infectious etiology less likely, patient subjectively has fevers so could possibly be resolution of a viral process.  Did administer 1000 mg of Tylenol in clinic with significant improvement in symptoms after 45 minutes.  Recommend she keep a record of her blood sugars and follow-up with PCP.  Anion gap is normal and no signs of acidosis or urgent hospitalization.  - ISTAT CHEM 7 ([facility] ONLY)  - HM1(CBC and Differential)  - HM1 (CBC with Diff)  - acetaminophen tablet 975 mg (TYLENOL)  - acetaminophen (TYLENOL EXTRA STRENGTH) 500 MG tablet; Take 2 tablets (1,000 mg total) by mouth every 6 (six) hours as needed for pain.  Dispense: 100  tablet; Refill: 2    Options for treatment and follow-up care were reviewed with the patient and/or guardian. Discussed symptoms in which to return to clinic sooner or go to the ER for evaluation. Marco Khan and/or guardian engaged in the decision making process and verbalized understanding of the options discussed and agreed with the final plan.    Marvin Barry MD

## 2021-05-29 NOTE — PROGRESS NOTES
"HPI  - 55 yo female here for DM check       DM Type 2  well controlled  A1c 7.9 on 3/7/19 -> 7.0 today  Increase [id number] XL one times a day with meals (taking two times a day caused low blood sugars per pt) - per patient she taking med more consistently  BP wnl today on losartan  LDL 72 on 6/22/17 (with elevated ) - 90 and 347 on 8/2/18  Eye exam 6/1/2018  microalbuminuria - wnl  8/2/18 on  Losartan   Reviewed consult with  DM Ed on 5/20/19  Handouts in [place] given last appt       HTN - well controlled on losartan 100mg daily      H/o vit D deficiency - last level 40.6 on 3/7/19  Taking ergo 05121 weekly       H/o polycythemia  -  Cbc wnl on 4/6/19  hgb 13.7 on 4/6/19      Current Outpatient Medications   Medication Sig     acetaminophen (MAPAP EXTRA STRENGTH) 500 MG tablet TAKE 1 TABLET (500 MG TOTAL)  BY MOUTH 2 (TWO) TIMES A DAY AS NEEDED FOR PAIN.     generic lancets (MICROLET LANCET) USE TO TEST 2 TIMES A DAY     losartan (COZAAR) 100 MG tablet Take 1 tablet (100 mg total) by mouth daily.     metFORMIN (GLUCOPHAGE-XR) 750 MG 24 hr tablet Take 1 tablet (750 mg total) by mouth 2 (two) times a day with meals.     ONETOUCH DELICA LANCETS 33 gauge Misc Test twice daily     ONETOUCH ULTRA BLUE TEST STRIP strips TEST 2 TIMES A DAY     VITAMIN D2 50,000 unit capsule Take 1 capsule by mouth once a week.     acetaminophen (TYLENOL EXTRA STRENGTH) 500 MG tablet Take 2 tablets (1,000 mg total) by mouth every 6 (six) hours as needed for pain.     amitriptyline (ELAVIL) 10 MG tablet TAKE ONE TABLET BY MOUTH DAILY AT BEDTIME.     dimethicone-colloidal oatmeal (AVEENO) 1.3 % Lotn Apply to skin daily       Vitals:    06/17/19 1550   BP: 100/66   Pulse: (!) 54   Resp: 14   Temp: 97.6  F (36.4  C)   TempSrc: Oral   SpO2: 97%   Weight: 140 lb 3.2 oz (63.6 kg)   Height: 4' 11.41\" (1.509 m)     PHYSICAL EXAM   General Appearance: Awake and alert, in no acute distress  HEENT: neck is supple  CV: regular rate  Resp: No " respiratory distress. Breathing comfortably  Musculoskeletal: moving limbs comfortably with not deficits or deformities  Skin: no rashes noted  DM foot exam: normal pedal pulses, no deformities, good sensation to microfilament, no callouses      A/P  DM Type 2  well controlled  A1c  7.0 today  Continue [id number] XL daily   BP wnl today on losartan  LDL 90 and 347 on 8/2/18  Eye exam 6/1/2018 - needs help to r/s f/u eye exam  microalbuminuria - wnl  8/2/18 on  Losartan   Foot exam wnl today        HTN - well controlled on losartan 100mg daily      H/o vit D deficiency -resolved and ok to stop ergo 26563 weekly      H/o polycythemia  -  Resolved

## 2021-05-30 VITALS — WEIGHT: 148.8 LBS | BODY MASS INDEX: 29.21 KG/M2 | HEIGHT: 60 IN

## 2021-05-31 VITALS — WEIGHT: 143.3 LBS | BODY MASS INDEX: 28.13 KG/M2 | HEIGHT: 60 IN

## 2021-05-31 VITALS — BODY MASS INDEX: 28.25 KG/M2 | WEIGHT: 143.9 LBS | HEIGHT: 60 IN

## 2021-05-31 VITALS — BODY MASS INDEX: 27.64 KG/M2 | WEIGHT: 140.8 LBS | HEIGHT: 60 IN

## 2021-05-31 VITALS — BODY MASS INDEX: 28.17 KG/M2 | HEIGHT: 60 IN | WEIGHT: 143.5 LBS

## 2021-05-31 VITALS — WEIGHT: 143 LBS | BODY MASS INDEX: 28.4 KG/M2

## 2021-06-01 VITALS — BODY MASS INDEX: 27.62 KG/M2 | WEIGHT: 140.7 LBS | HEIGHT: 60 IN

## 2021-06-01 VITALS — HEIGHT: 60 IN | BODY MASS INDEX: 27.63 KG/M2 | WEIGHT: 140.75 LBS

## 2021-06-01 VITALS — BODY MASS INDEX: 27.88 KG/M2 | HEIGHT: 60 IN | WEIGHT: 142 LBS

## 2021-06-02 VITALS — BODY MASS INDEX: 28.33 KG/M2 | WEIGHT: 144.3 LBS | HEIGHT: 60 IN

## 2021-06-02 VITALS — BODY MASS INDEX: 28.47 KG/M2 | WEIGHT: 145 LBS | HEIGHT: 60 IN

## 2021-06-02 VITALS — WEIGHT: 146 LBS | BODY MASS INDEX: 28.97 KG/M2

## 2021-06-02 VITALS — BODY MASS INDEX: 28.89 KG/M2 | WEIGHT: 146.2 LBS

## 2021-06-02 NOTE — TELEPHONE ENCOUNTER
Medication Question or Clarification  Who is calling: Pharmacy: [facility]  What medication are you calling about? (include dose and sig)   metFORMIN (GLUCOPHAGE) 500 MG tablet  500 mg, Oral, 2 times daily with meals         Summary: Take 1 tablet (500 mg total) by mouth 2 (two) times a day with meals        Who prescribed the medication?: Dr Moctezuma  What is your question/concern?: The pharmacist is asking if the order for metformin 500mg is suppose to be XR as she was using the XR before  Or did the provider intend the dose to be immediate release metformin?  Please clarify.  Pharmacy: [facility]  Okay to leave a detailed message?: Yes [phone] [facility] CMT - Please call the pharmacy to obtain any additional needed information.

## 2021-06-02 NOTE — TELEPHONE ENCOUNTER
Refill Approved    Rx renewed per Medication Renewal Policy. Medication was last renewed on 9/30/18.    Amanda Patel, [facility] Triage/Med Refill 10/5/2019     Requested Prescriptions   Pending Prescriptions Disp Refills     ACCU-CHEK GEORGES PLUS TEST STRP strips [Pharmacy Med Name: ACCU-CHEK GEORGES PLUS TEST STRP] 50 strip 0     Sig: TEST 2 TIMES A DAY       Diabetic Supplies Refill Protocol Passed - 10/4/2019 11:41 AM        Passed - Visit with PCP or prescribing provider visit in last 6 months     Last office visit with prescriber/PCP: 6/17/2019 Jasmyne Moctezuma MD OR same dept: 6/17/2019 Jasmyne Moctezuma MD OR same specialty: 6/17/2019 Jasmyne Moctezuma MD  Last physical: 10/3/2019 Last MTM visit: Visit date not found   Next visit within 3 mo: Visit date not found  Next physical within 3 mo: Visit date not found  Prescriber OR PCP: Jasmyne Moctezuma MD  Last diagnosis associated with med order: 1. Controlled type 2 diabetes mellitus without complication, without long-term current use of insulin (H)  - ACCU-CHEK GEORGES PLUS TEST STRP strips [Pharmacy Med Name: ACCU-CHEK GEORGES PLUS TEST STRP]; TEST 2 TIMES A DAY  Dispense: 50 strip; Refill: 0    If protocol passes may refill for 12 months if within 3 months of last provider visit (or a total of 15 months).             Passed - A1C in last 6 months     Hemoglobin A1c   Date Value Ref Range Status   10/03/2019 7.6 (H) 3.5 - 6.0 % Final

## 2021-06-02 NOTE — PROGRESS NOTES
FEMALE PREVENTATIVE EXAM    Assessment and Plan:     Annual Exam -   Pap, mammo, colonoscopy up to date  Flu shot  Check lipids, CMP    Diabetes Mellitis Type 2  A1c increased from 7.0 to 7.6  Change metformin XL 750mg once daily to metformin 500mg BID  BP normal   Microalbuminuria - normal on losartan - recheck labs today  Eye exam on 8/303/19  Check lipid panel        Next follow up:  No follow-ups on file.    Immunization Review  Adult Imm Review: Due today, orders placed  BMI: 28.7    I discussed the following with the patient:   Adult Healthy Living: Importance of regular exercise  Healthy nutrition  Getting adequate sleep  Stress management  Use of seat belts        Subjective:   Chief Complaint: Marco Khan is an 54 y.o. female here for a preventative health visit.     HPI:       DM Type 2  well controlled  A1c 7.9 on 3/7/19 -> 7.0 on 6/17/19 and today 7.6   Home blood sugars in low 100's  Increase [id number] XL one times a day with meals (taking two times a day caused low blood sugars per pt) - per patient she taking med more consistently  BP wnl today on losartan  LDL 72 on 6/22/17 (with elevated ) - 90 and 347 on 8/2/18  Eye exam 6/1/2018  microalbuminuria - wnl  8/2/18 on  Losartan   Reviewed consult with  DM Ed on 5/20/19        HTN - well controlled on losartan 100mg daily      H/o vit D deficiency - last level 40.6 on 3/7/19        Healthy Habits  Are you taking a daily aspirin? No  Do you typically exercising at least 40 min, 3-4 times per week?  NO  Do you usually eat at least 4 servings of fruit and vegetables a day, include whole grains and fiber and avoid regularly eating high fat foods? Yes  Have you had an eye exam in the past two years? Yes  Do you see a dentist twice per year? NO  Do you have any concerns regarding sleep? No    Safety Screen  If you own firearms, are they secured in a locked gun cabinet or with trigger locks? The patient does not own any firearms  Do you feel you are safe  "where you are living?: Yes (10/3/2019  4:38 PM)  Do you feel you are safe in your relationship(s)?: Yes (10/3/2019  4:38 PM)      Review of Systems:  Please see above.  The rest of the review of systems are negative for all systems.     Pap History:   No - age 30-65 PAP every 3 years recommended  Cancer Screening       Status Date      MAMMOGRAM Next Due 8/2/2020      Done 8/2/2018 MAMMO SCREENING BILATERAL     Patient has more history with this topic...    PAP SMEAR Next Due 8/2/2023      Done 8/2/2018 GYNECOLOGIC CYTOLOGY (PAP SMEAR)     Patient has more history with this topic...    COLONOSCOPY Next Due 7/22/2026      Done 7/22/2016 COLONOSCOPY EXTERNAL RESULT          Patient Care Team:  Jasmyne Moctezuma MD as PCP - General  Jasmyne Moctezuma MD as Assigned PCP        History     Reviewed By Date/Time Sections Reviewed    Karen Bernard CMA 10/3/2019  4:41 PM Tobacco            Objective:   Vital Signs:   Visit Vitals  /76   Pulse (!) 55   Temp 97.6  F (36.4  C) (Oral)   Resp 14   Ht 4' 11.41\" (1.509 m)   Wt 144 lb 3.2 oz (65.4 kg)   LMP 04/11/2015   SpO2 97%   BMI 28.73 kg/m           PHYSICAL EXAM  OBJECTIVE:  Vitals listed above within normal limits  General appearance: well groomed, pleasant, well hydrated, nontoxic appearing  ENT: PERRL, throat clear  Neck: neck supple, no lymphadenopathy, no thyromegaly  Lungs: lungs clear to auscultation bilaterally, no wheezes or rhonchi  Heart: regular rate and rhythm, no murmurs, rubs or gallops  Abdomen: soft, nontender  Neuro: no focal deficits, CN II-XII grossly intact, alert and oriented  Psych:  mood stable, appears to have good insight and judgment  Pelvic exam: deferred no pap needed and not vag sx  BREAST EXAM: normal without suspicious masses, skin or nipple changes or axillary nodes, self-exam is taught and discussed        The 10-year ASCVD risk score ([place] BENJAMÍN Alas, et al., 2013) is: 5.1%    Values used to calculate the score:      Age: 54 " years      Sex: Female      Is Non- : No      Diabetic: Yes      Tobacco smoker: No      Systolic Blood Pressure: 120 mmHg      Is BP treated: Yes      HDL Cholesterol: 45 mg/dL      Total Cholesterol: 204 mg/dL

## 2021-06-02 NOTE — TELEPHONE ENCOUNTER
Called Helen at [facility] to relay due to pt A1C increasing from 7 to 7.6 PCP changed from metformin XL to daily metformin two times a day.  Thanks.

## 2021-06-03 VITALS — HEIGHT: 59 IN | WEIGHT: 140.2 LBS | BODY MASS INDEX: 28.26 KG/M2

## 2021-06-03 VITALS
WEIGHT: 144.2 LBS | SYSTOLIC BLOOD PRESSURE: 120 MMHG | OXYGEN SATURATION: 97 % | TEMPERATURE: 97.6 F | HEIGHT: 59 IN | RESPIRATION RATE: 14 BRPM | DIASTOLIC BLOOD PRESSURE: 76 MMHG | HEART RATE: 55 BPM | BODY MASS INDEX: 29.07 KG/M2

## 2021-06-04 VITALS
OXYGEN SATURATION: 96 % | SYSTOLIC BLOOD PRESSURE: 110 MMHG | TEMPERATURE: 98.5 F | WEIGHT: 140.87 LBS | HEART RATE: 60 BPM | HEIGHT: 59 IN | DIASTOLIC BLOOD PRESSURE: 70 MMHG | RESPIRATION RATE: 12 BRPM | BODY MASS INDEX: 28.4 KG/M2

## 2021-06-04 NOTE — TELEPHONE ENCOUNTER
Refill Approved    Rx renewed per Medication Renewal Policy. Medication was last renewed on 11/6/18.    Modesta Merritt, [facility] Triage/Med Refill 12/6/2019     Requested Prescriptions   Pending Prescriptions Disp Refills     losartan (COZAAR) 100 MG tablet [Pharmacy Med Name: LOSARTAN POTASSIUM 100 MG TAB] 30 tablet 0     Sig: TAKE 1 TABLET BY MOUTH DAILY       Angiotensin Receptor Blocker Protocol Passed - 12/5/2019 12:05 PM        Passed - PCP or prescribing provider visit in past 12 months       Last office visit with prescriber/PCP: 6/17/2019 Jasmyne Moctezuma MD OR same dept: 6/17/2019 Jasmyne Moctezuma MD OR same specialty: 6/17/2019 Jasmyne Moctezuma MD  Last physical: 10/3/2019 Last MTM visit: Visit date not found   Next visit within 3 mo: Visit date not found  Next physical within 3 mo: Visit date not found  Prescriber OR PCP: Jasmyne Moctezuma MD  Last diagnosis associated with med order: 1. Diabetes (H)  - losartan (COZAAR) 100 MG tablet [Pharmacy Med Name: LOSARTAN POTASSIUM 100 MG TAB]; TAKE 1 TABLET BY MOUTH DAILY  Dispense: 30 tablet; Refill: 0    2. HTN (hypertension)  - losartan (COZAAR) 100 MG tablet [Pharmacy Med Name: LOSARTAN POTASSIUM 100 MG TAB]; TAKE 1 TABLET BY MOUTH DAILY  Dispense: 30 tablet; Refill: 0    3. Microalbuminuria  - losartan (COZAAR) 100 MG tablet [Pharmacy Med Name: LOSARTAN POTASSIUM 100 MG TAB]; TAKE 1 TABLET BY MOUTH DAILY  Dispense: 30 tablet; Refill: 0    If protocol passes may refill for 12 months if within 3 months of last provider visit (or a total of 15 months).             Passed - Serum potassium within the past 12 months     Lab Results   Component Value Date    Potassium 4.5 10/03/2019             Passed - Blood pressure filed in past 12 months     BP Readings from Last 1 Encounters:   10/03/19 120/76             Passed - Serum creatinine within the past 12 months     Creatinine   Date Value Ref Range Status   10/03/2019 0.83 0.60 - 1.10  mg/dL Final

## 2021-06-05 VITALS
DIASTOLIC BLOOD PRESSURE: 80 MMHG | RESPIRATION RATE: 12 BRPM | OXYGEN SATURATION: 97 % | BODY MASS INDEX: 26.11 KG/M2 | SYSTOLIC BLOOD PRESSURE: 130 MMHG | TEMPERATURE: 98.5 F | WEIGHT: 133 LBS | HEART RATE: 59 BPM | HEIGHT: 60 IN

## 2021-06-05 VITALS
SYSTOLIC BLOOD PRESSURE: 130 MMHG | RESPIRATION RATE: 14 BRPM | WEIGHT: 138.5 LBS | DIASTOLIC BLOOD PRESSURE: 80 MMHG | OXYGEN SATURATION: 98 % | HEART RATE: 66 BPM | BODY MASS INDEX: 27.92 KG/M2 | HEIGHT: 59 IN | TEMPERATURE: 98.3 F

## 2021-06-05 NOTE — PROGRESS NOTES
"HPI - 55yo female here for DM check.       Diabetes Mellitis Type 2  A1c increased from 7.0 on 6/17/19  to 7.6 on 10/3/19 to 8.4 today  Changed metformin XL 750mg once daily to metformin 500mg two times a day last visit on 10/3/19  BP normal   Microalbuminuria - normal on on 10/3/19 on losartan -    Eye exam on 8/303/19  CMP wnl   LDL 98 on 10/3/19     Reviewed labs from 10/3/19    Current Outpatient Medications   Medication Sig     ACCU-CHEK GEORGES PLUS TEST STRP strips TEST 2 TIMES A DAY     acetaminophen (MAPAP EXTRA STRENGTH) 500 MG tablet TAKE 1 TABLET (500 MG TOTAL)  BY MOUTH 2 (TWO) TIMES A DAY AS NEEDED FOR PAIN.     generic lancets (MICROLET LANCET) USE TO TEST 2 TIMES A DAY     losartan (COZAAR) 100 MG tablet Take 1 tablet (100 mg total) by mouth daily.     metFORMIN (GLUCOPHAGE) 500 MG tablet Take 1 tablet (500 mg total) by mouth 2 (two) times a day with meals.     ONETOUCH DELICA LANCETS 33 gauge Misc Test twice daily     Vitals:    01/30/20 1625   BP: 110/70   Pulse: 60   Resp: 12   Temp: 98.5  F (36.9  C)   TempSrc: Oral   SpO2: 96%   Weight: 140 lb 14 oz (63.9 kg)   Height: 4' 11.41\" (1.509 m)     PHYSICAL EXAM   General Appearance: Awake and alert, in no acute distress  HEENT: neck is supple  CV: regular rate  Resp: No respiratory distress. Breathing comfortably  Musculoskeletal: moving limbs comfortably with not deficits or deformities  Skin: no rashes noted    Results for DARIEL JOY (MRN [id number]) as of 1/30/2020 16:33   Ref. Range 10/3/2019 16:30   Sodium Latest Ref Range: 136 - 145 mmol/L 140   Potassium Latest Ref Range: 3.5 - 5.0 mmol/L 4.5   Chloride Latest Ref Range: 98 - 107 mmol/L 104   CO2 Latest Ref Range: 22 - 31 mmol/L 23   Anion Gap, Calculation Latest Ref Range: 5 - 18 mmol/L 13   BUN Latest Ref Range: 8 - 22 mg/dL 20   Creatinine Latest Ref Range: 0.60 - 1.10 mg/dL 0.83   GFR MDRD Af Amer Latest Ref Range: >60 mL/min/1.73m2 >60   GFR MDRD Non Af Amer Latest Ref Range: >60 mL/min/1.73m2 " >60   Calcium Latest Ref Range: 8.5 - 10.5 mg/dL 9.7   AST Latest Ref Range: 0 - 40 U/L 24   ALT Latest Ref Range: 0 - 45 U/L 46 (H)   ALBUMIN Latest Ref Range: 3.5 - 5.0 g/dL 4.4   Protein, Total Latest Ref Range: 6.0 - 8.0 g/dL 7.3   Cholesterol Latest Ref Range: <=199 mg/dL 203 (H)   Alkaline Phosphatase Latest Ref Range: 45 - 120 U/L 77   Bilirubin, Total Latest Ref Range: 0.0 - 1.0 mg/dL 0.5   Triglycerides Latest Ref Range: <=149 mg/dL 324 (H)   HDL Cholesterol Latest Ref Range: >=50 mg/dL 40 (L)   LDL Calculated Latest Ref Range: <=129 mg/dL 98   Glucose Latest Ref Range: 70 - 125 mg/dL 145 (H)   Hemoglobin A1c Latest Ref Range: 3.5 - 6.0 % 7.6 (H)      Ref. Range 10/3/2019 17:03   Creatinine, Urine Latest Units: mg/dL 39.9   Microalbumin, Random Urine Latest Ref Range: 0.00 - 1.99 mg/dL <0.50   Microalbumin/Creatinine Ratio Random Urine Unknown SEE COMMENT       Recent Results (from the past 1008 hour(s))   Glycosylated Hemoglobin A1c    Collection Time: 01/30/20  4:13 PM   Result Value Ref Range    Hemoglobin A1c 8.4 (H) 3.5 - 6.0 %     A/P  Diabetes mellitis type 2  Increase metformin to 1000 two times a day with food  BP is normal on losartan  microalbumin wnl  Eye exam up to date  LDL wnl   Flu shot up to date, but pneumonia due b/d increased risk  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

## 2021-06-09 NOTE — PROGRESS NOTES
HPI - 50 yo female here for DM f/u.     DM -   A1C 6.8 on 9/19/16 on metformin XR 500mg but stopped this on 11/20/16  A1c was 6.1 on 12/13/16 and 6.2 on 3/13/17 - diet controlled with more fruits and vegetable and lime sour drink which traditional is believed to improve blood sugars  BP wnl   She stopped metformin b/c it made her itchy which improved after stopping a month ago.   She is taking lisinopril for microalbminuria and BP.    on 12/2016    Back pain is better.   She was doing exercises and medicines helped.   Tylenol and ibuprofen prn  She has norco for when pain is severe - about 1 x per week - no longer using     BP is good today and was good 10/2016 - 2 months ago        Vit D deficiency 11.8 on 4/2015 - > 30.7 on 12/2016  Took rx for weekly ergo 54167 weekly for 3-4 months     Thrombocytosis - resolved     Right hand tingling - intermittent  Now resolved  rx for amitriptyline given in Dec 2016 and sx resolved and only using prn about Q1-2 weeks        Current Outpatient Prescriptions   Medication Sig     acetaminophen (MAPAP EXTRA STRENGTH) 500 MG tablet TAKE 1 TABLET (500 MG TOTAL)  BY MOUTH 2 (TWO) TIMES A DAY AS NEEDED FOR PAIN.     ibuprofen (ADVIL,MOTRIN) 600 MG tablet Take 1 tablet (600 mg total) by mouth every 6 (six) hours as needed for pain.     lisinopril (PRINIVIL,ZESTRIL) 10 MG tablet Take 1 tablet (10 mg total) by mouth daily.     amitriptyline (ELAVIL) 10 MG tablet Take 1 tablet (10 mg total) by mouth bedtime as needed for sleep.     HYDROcodone-acetaminophen 5-325 mg per tablet Take 2 tablets by mouth bedtime as needed for pain.     metFORMIN (GLUCOPHAGE XR) 500 MG 24 hr tablet Take 1 tablet (500 mg total) by mouth daily with breakfast.     multivitamin (MULTIVITAMIN) per tablet      triamcinolone (KENALOG) 0.1 % cream 2 (two) times a day. Apply     VITAMIN D2 50,000 unit capsule TAKE ONE CAPSULE BY MOUTH WEEKLY     Vitals:    03/13/17 1116   BP: 104/62   Pulse: 65   Resp: 12  "  Temp: 98.1  F (36.7  C)   TempSrc: Oral   SpO2: 94%   Weight: 148 lb 12.8 oz (67.5 kg)   Height: 4' 11.75\" (1.518 m)     PHYSICAL EXAM   General Appearance: Awake and alert, in no acute distress  HEENT: neck is supple  CV: regular rate  Resp: No respiratory distress. Breathing comfortably  Musculoskeletal: moving limbs comfortably with not deficits or deformities  Skin: no rashes noted    DM - diet controlled  A1C 6.8 on 9/19/16 on metformin XR 500mg but stopped this on 11/20/16  A1c was 6.1 on 12/13/16 and 6.2 on 3/13/17 - diet controlled with more fruits and vegetable and lime sour drink which traditional is believed to improve blood sugars  BP wnl   She stopped metformin b/c it made her itchy which improved after stopping a month ago.   She is taking lisinopril for microalbminuria and BP.    on 12/2016 - discussed less fat and oil in diet and will recheck before starting statin    Back pain is better.   She was doing exercises and medicines helped.   Tylenol and ibuprofen prn  She has norco for when pain is severe - about 1 x per week - no longer using     BP is good today and was good 10/2016 - 2 months ago        Vit D deficiency 11.8 on 4/2015 - > 30.7 on 12/2016  Took rx for weekly ergo 54863 weekly for 3-4 months     Thrombocytosis - resolved     Right hand tingling - intermittent  Now resolved  rx for amitriptyline given in Dec 2016 and sx resolved and only using prn about Q1-2 weeks      [id number] face to face with patient with more the 50% spent in counseling, reviewing chart, and coordination of care and discussing problems listed above.     "

## 2021-06-11 NOTE — PROGRESS NOTES
"HPI - 53 yo female her to f/u on her DM.     DM  A1c was 6.1 on 12/13/16 and 6.2 on 3/13/17 - diet controlled with more fruits and vegetable and lime sour drink which traditional is believed to improve blood sugars  A1C 6.8 on 9/19/16 on metformin XR 500mg but stopped this on 11/20/16 b/c of rash/itchiness  A1c today 7.6 - unclear why higher  BP wnl - on lisinopril  microalbumin wnl on lisinopril  LDL 72 on 6/22/17     Hand tingling resolved and she stopped amitriptyline    Headaches and body aches - ibuprofen Q1-2 weeks prn is helping  And icy hot    mammo 7/2015    Social - works at [employer]    Current Outpatient Prescriptions   Medication Sig     acetaminophen (MAPAP EXTRA STRENGTH) 500 MG tablet TAKE 1 TABLET (500 MG TOTAL)  BY MOUTH 2 (TWO) TIMES A DAY AS NEEDED FOR PAIN.     ibuprofen (ADVIL,MOTRIN) 600 MG tablet Take 1 tablet (600 mg total) by mouth every 6 (six) hours as needed for pain.     amitriptyline (ELAVIL) 10 MG tablet Take 1 tablet (10 mg total) by mouth bedtime as needed for sleep.     lisinopril (PRINIVIL,ZESTRIL) 10 MG tablet Take 1 tablet (10 mg total) by mouth daily.     multivitamin (MULTIVITAMIN) per tablet      triamcinolone (KENALOG) 0.1 % cream 2 (two) times a day. Apply     Vitals:    06/29/17 1555   BP: 118/68   Patient Site: Left Arm   Patient Position: Sitting   Cuff Size: Adult Regular   Pulse: 67   Resp: 16   Temp: 98.3  F (36.8  C)   TempSrc: Oral   SpO2: 98%   Weight: 143 lb 8 oz (65.1 kg)   Height: 4' 11.5\" (1.511 m)     PHYSICAL EXAM   General Appearance: Awake and alert, in no acute distress  HEENT: neck is supple  CV: regular rate  Resp: No respiratory distress. Breathing comfortably  Musculoskeletal: moving limbs comfortably with not deficits or deformities  Skin: no rashes noted      A/P  DM  A1c today 7.6 - she had A1c 6.2 when eating lots for fruits and vegetables but she stopped these and started eating more rice  BP wnl - on lisinopril  microalbumin wnl on " lisinopril  LDL 72 on 6/22/17   Eye exam 3/22/17  Ref DM ED      Hand tingling resolved and she stopped amitriptyline    Headaches and body aches - ibuprofen Q1-2 weeks prn is helping  And icy hot    mammo 7/2015 - RTC for Px    Spent 25 min face to face with patient with more the 50% spent in counseling, reviewing chart, and coordination of care and discussing problems listed above.

## 2021-06-12 NOTE — PROGRESS NOTES
Assessment:      Healthy female exam.   Contraception: post menopausal status  Last Pap: 6/11/2013. Results were: normal and neg HPV - repeat next year  Last mammogram: 3/16/2016. Results were: normal  - repeat next year  Colonoscopy 7/22/16 - polyp tubular adenoma and hemorroids - repeat in 5 yrs  Shots - up to date. Flu shot given today      DM  A1c 7.6 on 6/22/17-  Not yet on meds b/c previously diet controlled  BP wnl - on lisinopril  microalbumin wnl on lisinopril  LDL 72 on 6/22/17   Eye exam 3/22/17  Referred to DM ED - scheduled for next month 9/6 for education only  May consider starting metformin if A1c does not decrease next check      Hand tingling resolved and she stopped amitriptyline - resolved     Headaches and body aches -resolved    H/o vit D deficiency -   Last level 30.7 on 12/13/16    Varicose veins -   Worsen and painful if standing still for a long time but movement and walking helps and able to walk at work  Discussed compression stockings and surgical tx vs walking        Subjective:      Marco Khan is a 52 y.o. female who presents for an annual exam. The patient is not sexually active. The patient participates in regular exercise: yes. The patient reports that there is not domestic violence in her life.     DM  A1c 7.6 on 6/22/17-  Not yet on meds b/c previously diet controlled  BP wnl - on lisinopril  microalbumin wnl on lisinopril  LDL 72 on 6/22/17   Eye exam 3/22/17  Referred to DM ED - scheduled for next month 9/6 for education only  May consider starting metformin if A1c does not decrease next check      Hand tingling resolved and she stopped amitriptyline - resolved     Headaches and body aches -resolved       Healthy Habits:   Regular Exercise: Yes  Sunscreen Use: No  Healthy Diet: Yes  Dental Visits Regularly: No  Seat Belt: Yes  Sexually active: No  Self Breast Exam Monthly:Yes  Hemoccults: N/A  Flex Sig: N/A  Colonoscopy: Yes  Lipid Profile: No  Glucose Screen: No  Prevention of  Osteoporosis: N/A  Last Dexa: N/A  Guns at Home:  No      Immunization History   Administered Date(s) Administered     Hep A, historic 2010, 2011     Hep B, historic 2010, 2011     Influenza, inj, historic 2011, 10/11/2011, 2014     Influenza, seasonal,quad inj 36+ mos 2016     Influenza, seasonal,quad inj 6-35 mos 2012     MMR 2010, 2010     Td, historic 2010, 2011     Tdap 2010     Varicella 2010, 2011     Immunization status: up to date and documented.      Gynecologic History  Patient's last menstrual period was 2015.  Contraception: post menopausal status  Last Pap: 2013. Results were: normal and neg HPV  Last mammogram: 3/16/2016. Results were: normal       OB History    Para Term  AB Living   6 6 6      SAB TAB Ectopic Multiple Live Births             # Outcome Date GA Lbr J Luis/2nd Weight Sex Delivery Anes PTL Lv   6 Term            5 Term            4 Term            3 Term            2 Term            1 Term                   Current Outpatient Prescriptions   Medication Sig Dispense Refill     acetaminophen (MAPAP EXTRA STRENGTH) 500 MG tablet TAKE 1 TABLET (500 MG TOTAL)  BY MOUTH 2 (TWO) TIMES A DAY AS NEEDED FOR PAIN. 90 tablet 5     ibuprofen (ADVIL,MOTRIN) 600 MG tablet Take 1 tablet (600 mg total) by mouth every 6 (six) hours as needed for pain. 90 tablet 6     lisinopril (PRINIVIL,ZESTRIL) 10 MG tablet Take 1 tablet (10 mg total) by mouth daily. 30 tablet 11     No current facility-administered medications for this visit.      No past medical history on file.  Past Surgical History:   Procedure Laterality Date     MT LIGATE FALLOPIAN TUBE      Description: Tubal Ligation;  Recorded: 2013;  Comments: in [place],      Review of patient's allergies indicates no known allergies.  Family History   Problem Relation Age of Onset     Breast cancer Neg Hx      Social History     Social  "History     Marital status:      Spouse name: N/A     Number of children: N/A     Years of education: N/A     Occupational History     Not on file.     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        Lives with  and 6 kids       Review of Systems  Review of Systems      REVIEW OF SYSTEMS  General: no weight changes, fatigue  HEENT:  no HA,  no vision changes, URI sx  Respiratory:  no cough, dyspnea  Cardiovascular: no chest pain, palpitations  Gastrointestinal: no nausea/vomiting, diarrhea/constipation, melena  : no dysuria, no vaginal d/c  Neurologic: no seizures, focal weakness, tremors  Skin: no rashes        Objective:         Vitals:    08/31/17 1626   BP: 128/80   Pulse: (!) 58   Resp: 16   Temp: 97.4  F (36.3  C)   SpO2: 99%   Weight: 143 lb 14.4 oz (65.3 kg)   Height: 4' 11.5\" (1.511 m)     Body mass index is 28.58 kg/(m^2).    Physical  Physical Exam     OBJECTIVE:  Vitals listed above within normal limits  General appearance: well groomed, pleasant, well hydrated, nontoxic appearing  ENT: PERRL, throat clear  Neck: neck supple, no lymphadenopathy, no thyromegaly  Lungs: lungs clear to auscultation bilaterally, no wheezes or rhonchi  Heart: regular rate and rhythm, no murmurs, rubs or gallops  Abdomen: soft, nontender  Neuro: no focal deficits, CN II-XII grossly intact, alert and oriented  Psych:  mood stable, appears to have good insight and judgment  MSK: varicose veins  BREAST EXAM: normal without suspicious masses, skin or nipple changes or axillary nodes, self-exam is taught and discussed        "

## 2021-06-12 NOTE — TELEPHONE ENCOUNTER
RN cannot approve Refill Request    RN can NOT refill this medication Protocol failed and NO refill given. Last office visit: 1/30/2020 Jasmyne Moctezuma MD Last Physical: 10/3/2019 Last MTM visit: Visit date not found Last visit same specialty: 1/30/2020 Jasmyne Moctezuma MD.  Next visit within 3 mo: Visit date not found  Next physical within 3 mo: Visit date not found      Irma Husain, [facility] Connection Triage/Med Refill 10/6/2020    Requested Prescriptions   Pending Prescriptions Disp Refills     generic lancets (ACCU-CHEK SOFTCLIX LANCETS) [Pharmacy Med Name: ACCU-CHEK SOFTCLIX LANCETS Miscellaneous] 100 each 7     Sig: USE TO TEST 2 TIMES A DAY       Diabetic Supplies Refill Protocol Failed - 10/3/2020 11:09 AM        Failed - Visit with PCP or prescribing provider visit in last 6 months     Last office visit with prescriber/PCP: 1/30/2020 Jasmyne Moctezuma MD OR same dept: 1/30/2020 Jasmyne Moctezuma MD OR same specialty: 1/30/2020 Jasmyne Moctezuma MD  Last physical: 10/3/2019 Last MTM visit: Visit date not found   Next visit within 3 mo: Visit date not found  Next physical within 3 mo: Visit date not found  Prescriber OR PCP: Jasmyne Moctezuma MD  Last diagnosis associated with med order: 1. Controlled type 2 diabetes mellitus without complication, without long-term current use of insulin (H)  - ACCU-CHEK SOFTCLIX LANCETS lancets [Pharmacy Med Name: ACCU-CHEK SOFTCLIX LANCETS Miscellaneous]; USE TO TEST 2 TIMES A DAY  Dispense: 100 each; Refill: 7    If protocol passes may refill for 12 months if within 3 months of last provider visit (or a total of 15 months).             Failed - A1C in last 6 months     Hemoglobin A1c   Date Value Ref Range Status   01/30/2020 8.4 (H) 3.5 - 6.0 % Final

## 2021-06-13 NOTE — PROGRESS NOTES
HPI - 53 yo female here for DM check.     DM  A1c 7.6 on 6/22/17-  Not yet on meds b/c previously diet controlled  Does not check blood sugars b/c afraid of needed  BP wnl - on lisinopril  microalbumin wnl on 6/22/17 on lisinopril  LDL 72 on 6/22/17 (with elevated TG)  Eye exam 3/22/17  Referred to DM ED - scheduled  9/6       H/o vit D deficiency -   Last level 30.7 on 12/13/16    Headaches and body pain -   Ibuprofen prn helps - pain has improved a lot and only uses about 3 x per month    Current Outpatient Prescriptions   Medication Sig     ibuprofen (ADVIL,MOTRIN) 400 MG tablet Take 400 mg by mouth every 8 (eight) hours as needed for pain.     lisinopril (PRINIVIL,ZESTRIL) 10 MG tablet Take 1 tablet (10 mg total) by mouth daily.     acetaminophen (MAPAP EXTRA STRENGTH) 500 MG tablet TAKE 1 TABLET (500 MG TOTAL)  BY MOUTH 2 (TWO) TIMES A DAY AS NEEDED FOR PAIN.     blood glucose test (CONTOUR NEXT STRIPS) strips Use 1 each As Directed 2 (two) times a day. Dispense brand per patient's insurance at pharmacy discretion.     generic lancets (FINGERSTIX LANCETS) Dispense brand per patient's insurance at pharmacy discretion.     Vitals:    10/09/17 1418   BP: 112/68   Pulse: 62   Resp: 18   Temp: 97.7  F (36.5  C)   SpO2: 97%        PHYSICAL EXAM   General Appearance: Awake and alert, in no acute distress  HEENT: neck is supple  CV: regular rate  Resp: No respiratory distress. Breathing comfortably  Musculoskeletal: moving limbs comfortably with not deficits or deformities  Skin: no rashes noted    A/P  DM  A1c 7.6 on 6/22/17-  7.9 today will start metformin XR 750mg  Discussed smaller amounts of rice and more vegetables  BP wnl - on lisinopril  microalbumin wnl on 6/22/17 on lisinopril  LDL 72 on 6/22/17 (with elevated TG)  Eye exam 3/22/17   DM ED apt on  9/6       H/o vit D deficiency -   Last level 30.7 on 12/13/16  recheck    Headaches and body pain -   Ibuprofen prn helps - pain has improved a lot and only uses  about 3 x per month    Spent 25 min face to face with patient with more the 50% spent in counseling, reviewing chart, and coordination of care and discussing problems listed above.

## 2021-06-13 NOTE — PROGRESS NOTES
"HPI -55-year-old female here for a DM check.  Her last appointment was almost a year ago in January 2020    Diabetes Mellitis Type 2  A1c increased from 7.0 on 6/17/19  to 7.6 on 10/3/19 to 8.4 on 1/30/20 -> 8.8 today  She reports that most of her blood sugars are low in the 60 range, but her A1c is higher than one would expect for that  Not taking meds for a week  Changed metformin XL 750mg once daily to metformin 500mg two times a day last visit on 10/3/19  BP normal   Microalbuminuria - normal on on 10/3/19 on losartan -    Eye exam on 8/303/19 at [facility], she has been waiting for pandemic settle down  CMP wnl 10/2019  LDL 98 on 10/3/19      Vaccines -   Flu 9/8/20     Working at [employer]    Patient Active Problem List   Diagnosis     Lower Back Pain     Sciatica     Limb Pain     Tingling (Paresthesia)     Vitamin D deficiency     Gastritis Due To H. Pylori     Abdominal Pain     Headache     Overweight     Tubular adenoma of colon on colonscopy 7/2016 - repeat in 5 years     Tingling in extremities     Diabetes mellitus (H)     Microalbuminuria     Current Outpatient Medications   Medication Sig     ACCU-CHEK GEORGES PLUS TEST STRP strips TEST 2 TIMES A DAY     acetaminophen (MAPAP EXTRA STRENGTH) 500 MG tablet TAKE 1 TABLET (500 MG TOTAL)  BY MOUTH 2 (TWO) TIMES A DAY AS NEEDED FOR PAIN.     generic lancets (ACCU-CHEK SOFTCLIX LANCETS) USE TO TEST 2 TIMES A DAY     losartan (COZAAR) 100 MG tablet Take 1 tablet (100 mg total) by mouth daily.     metFORMIN (GLUCOPHAGE) 1000 MG tablet Take 1 tablet (1,000 mg total) by mouth 2 (two) times a day with meals.     ONETOUCH DELICA LANCETS 33 gauge Misc Test twice daily     Vitals:    12/14/20 1609   BP: 130/80   Pulse: 66   Resp: 14   Temp: 98.3  F (36.8  C)   TempSrc: Oral   SpO2: 98%   Weight: 138 lb 8 oz (62.8 kg)   Height: 4' 11.41\" (1.509 m)     PHYSICAL EXAM   General Appearance: Awake and alert, in no acute distress  HEENT: neck is supple  CV: regular " rate  Resp: No respiratory distress. Breathing comfortably  Musculoskeletal: moving limbs comfortably with not deficits or deformities  Skin: no rashes noted  DM foot exam: normal pedal pulses, no deformities, good sensation to microfilament, no callouses    A/P  1. Diabetes mellitus due to underlying condition with microalbuminuria, without long-term current use of insulin (H)  Restart Metformin at 1000 mg twice daily  Continue losartan for microalbuminuria  - Glycosylated Hemoglobin A1c  - Comprehensive Metabolic Panel  - Lipid Cascade RANDOM  - Microalbumin, Random Urine  - metFORMIN (GLUCOPHAGE) 1000 MG tablet; Take 1 tablet (1,000 mg total) by mouth 2 (two) times a day with meals.  Dispense: 180 tablet; Refill: 3  - losartan (COZAAR) 100 MG tablet; Take 1 tablet (100 mg total) by mouth daily.  Dispense: 90 tablet; Refill: 3    HTN (hypertension)  With current meds  - losartan (COZAAR) 100 MG tablet; Take 1 tablet (100 mg total) by mouth daily.  Dispense: 90 tablet; Refill: 3    Microalbuminuria  - Microalbumin, Random Urine  - losartan (COZAAR) 100 MG tablet; Take 1 tablet (100 mg total) by mouth daily.  Dispense: 90 tablet; Refill: 3     Headache  Excedrin frequent but will  refill Tylenol for as needed  - acetaminophen (MAPAP EXTRA STRENGTH) 500 MG tablet; TAKE 1 TABLET (500 MG TOTAL)  BY MOUTH 2 (TWO) TIMES A DAY AS NEEDED FOR PAIN.  Dispense: 90 tablet; Refill: 5    Spent 20 min face to face with patient with more the 50% spent in counseling, reviewing chart with patient and coordination of care, medication reconciliation and discussing problems listed above.   [employer] professional phone  used.

## 2021-06-15 NOTE — PROGRESS NOTES
"HPI - 51 yo female here for DM check.       DM  A1c 7.6 on 6/22/17-  7.9 started metformin XR 750mg on 10/9/17 -> 6.8 on 1/11/18  Discussed smaller amounts of rice and more vegetables  BP elevated today - on lisinopril  microalbumin wnl on 6/22/17 on lisinopril  LDL 72 on 6/22/17 (with elevated TG)  Eye exam 3/22/17   DM ED apt on  9/6         H/o vit D deficiency -   Last level 30.7 on 12/13/16 - > 28.0     Headaches and body pain -   Ibuprofen prn helps - pain has improved a lot and only uses about 3 x per week  HA and dizziness have been worse this month and BP elevated at pharmacy  Also c/o stiff neck    Tingling in fingers when she wakes up periodically    Dry skin - using an OTC scented lotion    Current Outpatient Prescriptions   Medication Sig Note     acetaminophen (MAPAP EXTRA STRENGTH) 500 MG tablet TAKE 1 TABLET (500 MG TOTAL)  BY MOUTH 2 (TWO) TIMES A DAY AS NEEDED FOR PAIN.      blood glucose test (CONTOUR NEXT STRIPS) strips Use 1 each As Directed 2 (two) times a day. Dispense brand per patient's insurance at pharmacy discretion.      generic lancets (FINGERSTIX LANCETS) Dispense brand per patient's insurance at pharmacy discretion.      metFORMIN (GLUCOPHAGE XR) 750 MG 24 hr tablet Take 1 tablet (750 mg total) by mouth daily with supper.      VITAMIN D2 50,000 unit capsule Take 1 capsule by mouth once a week. 1/11/2018: Received from: External Pharmacy     ibuprofen (ADVIL,MOTRIN) 400 MG tablet Take 400 mg by mouth every 8 (eight) hours as needed for pain.      Vitals:    01/11/18 1024 01/11/18 1030   BP: 140/90 (!) 144/92   Pulse: 61    Resp: 12    Temp: 97.6  F (36.4  C)    TempSrc: Oral    SpO2: 98%    Weight: 140 lb 12.8 oz (63.9 kg)    Height: 4' 11.84\" (1.52 m)      OBJECTIVE:  Vitals listed above within normal limits  General appearance: well groomed, pleasant, well hydrated, nontoxic appearing  ENT: PERRL, throat clear  Neck: neck supple, no lymphadenopathy, no thyromegaly  Lungs: lungs clear " to auscultation bilaterally, no wheezes or rhonchi  Heart: regular rate and rhythm, no murmurs, rubs or gallops  Abdomen: soft, nontender  Neuro: no focal deficits, CN II-XII grossly intact, alert and oriented  Psych:  mood stable, appears to have good insight and judgment  DM foot exam: normal pedal pulses, no deformities, good sensation to microfilament, no callouses    Recent Results (from the past 1008 hour(s))   Glycosylated Hemoglobin A1c    Collection Time: 01/11/18 10:19 AM   Result Value Ref Range    Hemoglobin A1c 6.8 (H) 3.5 - 6.0 %     A/P  DM  A1c 7.6 on 6/22/17-  7.9 started metformin XR 750mg on 10/9/17 -> 6.8 on 1/11/18  Discussed smaller amounts of rice and more vegetables  BP elevated today - on lisinopril (will stop ibuprofen and recheck   microalbumin wnl on 6/22/17 on lisinopril  LDL 72 on 6/22/17 (with elevated TG)  Eye exam 3/22/17   DM ED apt on  9/6   CMP pending     HTN -   BP unusually elevated   Will continue lisinopril  Stop ibuprofen  F/u for BP check with MTM, consider increasing lisinopril if indicated       H/o vit D deficiency -   Last level 30.7 on 12/13/16 - > 28.0 on ergo     Headaches and body pain -   Stop ibuprofen b/c of HTN  Start amitriptyline for prophylaxis     Dry skin - rx aveeno    Spent 25 min face to face with patient with more the 50% spent in counseling, reviewing chart, and coordination of care and discussing problems listed above.

## 2021-06-15 NOTE — PROGRESS NOTES
FEMALE PREVENTATIVE EXAM    Assessment and Plan:    Annual physical exam  Mammo done today and negative  Pap up-to-date  Colonoscopy due to follow-up on tubular adenoma  Due for tetanus shot  - Ambulatory referral to G[facility]ology - [place] [facility]  - Td, Preservative Free (green label)    Need for Td vaccine  - Td, Preservative Free (green label)    Poorly controlled type 2 diabetes mellitus (H)  A1c is still elevated at 8.3.  She reports having low blood sugars if she takes the Metformin in the morning with not eating very much and then going to work.  So it sounds like she is only taking the Metformin mostly once a day sometimes twice a day.  I will switch her to Metformin extended release 750 mg tablets to take 2 tablets together with her biggest meal of the day which sounds like it is at 9 AM at her work break.  We will recheck this in 3 months to see if she is more consistent.  Refer her for her to follow-up with her diabetes educator to just reinforce her medication changes and monitoring her blood sugars.  - metFORMIN (GLUCOPHAGE XR) 750 MG 24 hr tablet; Take 2 tablets (1,500 mg total) by mouth daily.  Dispense: 60 tablet; Refill: 11  - Ambulatory referral to Diabetes Education Program (CDE)    Tingling (Paresthesia)  This is a known issue that I take into account for medical decisions but no current exacerbation or new concerns.      Tubular adenoma of colon on colonscopy 7/2016 - repeat in 5 years  - Ambulatory referral to G[facility]enterology - [place] [facility]     Polypharmacy  Reconciled medications    Language barrier affecting health care      Patient has been advised of split billing requirements and indicates understanding: Yes        Next follow up: 3 months  Immunization Review  Adult Imm Review: Due today, orders placed      I discussed the following with the patient:   Adult Healthy Living: Importance of regular exercise  Healthy nutrition  Getting adequate sleep  Stress management  Use of seat  "belts        Subjective:   Chief Complaint: Marco Khan is an 55 y.o. female here for a preventative health visit.    Patient has been advised of split billing requirements and indicates understanding: Yes  HPI:   mammo neg today  Pap/HPV neg 2018  Colonoscopy - 7/2016 tubular adenoma and repeat in 5 years  Vaccines:   due for Td  HBV immune from vaccines (HBV sAb pos, sAg neg, core Ab neg in 2010 labs)      Diabetes Mellitis Type 2  A1c increased from 7.0 on 6/17/19  to 7.6 on 10/3/19 to 8.4 on 1/30/20 -> 8.8 on 12/14/20 -> 8.3 today  Taking metformin 1000mg two times a day ( but sometimes only takes 1 time per day if \"blood sugar is too low 120 b/c when she works blood sugar drops to 58 and she feels tired - she does not eat much except a snack before work and then eats a meal at 9am)   BP normal   Microalbuminuria - normal on on 10/3/19 and 12/14/21 on losartan -    Eye exam on 8/303/19 at [facility], she has been waiting for pandemic settle down  CMP wnl 10/2019 and 12/14/20  LDL 98 on 10/3/19 - > 97 on 12/14/20    Polypharmacy -   -long discussion about how to take her meds with meals     Healthy Habits  Are you taking a daily aspirin? No  Do you typically exercising at least 40 min, 3-4 times per week?  NO  Do you usually eat at least 4 servings of fruit and vegetables a day, include whole grains and fiber and avoid regularly eating high fat foods? NO  Have you had an eye exam in the past two years? Yes  Do you see a dentist twice per year? NO  Do you have any concerns regarding sleep? No    Safety Screen  If you own firearms, are they secured in a locked gun cabinet or with trigger locks? The patient does not own any firearms  Do you feel you are safe where you are living?: Yes (3/11/2021  6:16 PM)  Do you feel you are safe in your relationship(s)?: Yes (3/11/2021  6:16 PM)      Review of Systems:  Please see above.  The rest of the review of systems are negative for all systems.     Pap History:   No - " "age 30-65 PAP every 3 years recommended  Cancer Screening       Status Date      MAMMOGRAM Next Due 3/15/2023      Done 3/15/2021 MAMMO SCREENING BILATERAL     Patient has more history with this topic...    PAP SMEAR Next Due 8/2/2023      Done 8/2/2018 GYNECOLOGIC CYTOLOGY (PAP SMEAR)     Patient has more history with this topic...          Patient Care Team:  Jasmyne Moctezuma MD as PCP - General  Jasmyne Moctezuma MD as Assigned PCP        History     Not marked as reviewed during this visit.            Objective:   Vital Signs:   Visit Vitals  /80   Pulse (!) 59   Temp 98.5  F (36.9  C) (Oral)   Resp 12   Ht 4' 11.61\" (1.514 m)   Wt 133 lb (60.3 kg)   LMP 04/11/2015   SpO2 97%   BMI 26.32 kg/m           PHYSICAL EXAM  OBJECTIVE:  Vitals listed above within normal limits  General appearance: well groomed, pleasant, well hydrated, nontoxic appearing  ENT: PERRL, throat clear  Neck: neck supple, no lymphadenopathy, no thyromegaly  Lungs: lungs clear to auscultation bilaterally, no wheezes or rhonchi  Heart: regular rate and rhythm, no murmurs, rubs or gallops  Abdomen: soft, nontender  Neuro: no focal deficits, CN II-XII grossly intact, alert and oriented  Psych:  mood stable, appears to have good insight and judgment  Pelvic exam: no indicated , no vag sx and pap not due  BREAST EXAM: normal without suspicious masses, skin or nipple changes or axillary nodes, self-exam is taught and discussed  DM foot exam: normal pedal pulses, no deformities, good sensation to microfilament, no callouses      The 10-year ASCVD risk score (Bairoil DC Jr., et al., 2013) is: 6.4%    Values used to calculate the score:      Age: 55 years      Sex: Female      Is Non- : No      Diabetic: Yes      Tobacco smoker: No      Systolic Blood Pressure: 130 mmHg      Is BP treated: Yes      HDL Cholesterol: 44 mg/dL      Total Cholesterol: 196 mg/dL    "

## 2021-06-16 NOTE — PROGRESS NOTES
"MTM Encounter    Assessment/Plan  Hypertension: Uncontrolled. Goal blood pressure is <140/90 mm Hg per JNC-8 hypertension guidelines. Pa may be experiencing a cough from lisinopril. Her blood pressure is currently uncontrolled indicating an increase in her blood pressure medication dose is warranted. Recommend switching her blood pressure medication from lisinopril to losartan. Initiate losartan 50 mg which will provide resolution of cough and will have a greater blood pressure lowering effect. Her last potassium and serum creatinine levels from January 2018, were wnl and we can recheck at next PCP appt.  Plan  1. Stop lisinopril 10 mg daily.  2. Start losartan 50 mg daily.  3. Recheck BMP in 6 weeks.    Follow Up  Follow-up in clinic with PCP in 6 weeks at next scheduled visit for hypertension and BMP.    Subjective/Objective  Marco Khan is a 52 y.o. female here for an initial medication therapy management (MTM) appointment; her chief concern today is hypertension.    Hypertension: Pa is currently taking lisinopril 10 mg once daily. She states that she has been coughing all of the time and has a dry throat. Her blood pressure in clinic today was 148/76 mm Hg.  Last K 4.2 on 1/11/18.  Last Scr 0.77 on 1/11/18. Scr 0.71 on 12/13/16.    Diabetes: She is taking metformin  mg once daily. She also checks her blood sugars one to two times daily. Pa reports morning fasting blood sugars between 110-115, and evening fasting blood sugars before dinner around 140.    Headaches and Body Pain: Pa takes amitriptyline 10 mg at bedtimes as needed, and tylenol 1,000 mg as needed. She states she has taken amitriptyline 2-3 times since it was prescribed to her on 1/11/18. She takes Tylenol once a week or every other week if needed for \"bad\" headaches. Pa has not had any headaches in the past week. She states that headaches are worse when she is at work and very tired or sick. Taking medication helps to resolve her headache symptoms. " Drinks 64 ounces of water per day. Drinks 1 cup of coffee daily.     Vitamin D Deficiency: Pa takes Vitamin D 50,000 unit capsule once weekly. Last Vitamin D level was 45.7 ng/mL on 1/11/18.    ----------------    Pa's medication list was reviewed with them, discussing reason for use, directions for use, and potential side effects of each medication as needed. Indication, safety, efficacy, and convenience was assessed for all medications addressed above.  No environmental factors were noted currently affecting patient.  This care plan was communicated via EMR with her primary care provider, Jasmyne Moctezuma MD, who is the authorizing prescriber for this visit.  Direct supervision was available by either the patient's PCP or other available provider.  Time and complexity billing metrics are included in the docflowsheet linked to this visit.  Time spent: 45 minutes    Lisa Heller, Student Pharmacist,  Cosigner:  Argenis IglesiasD  [facility] Pharmacist  [facility]  [phone]

## 2021-06-17 ENCOUNTER — OFFICE VISIT - HEALTHEAST (OUTPATIENT)
Dept: FAMILY MEDICINE | Facility: CLINIC | Age: 56
End: 2021-06-17

## 2021-06-17 DIAGNOSIS — Z75.8 LANGUAGE BARRIER AFFECTING HEALTH CARE: ICD-10-CM

## 2021-06-17 DIAGNOSIS — E11.65 POORLY CONTROLLED TYPE 2 DIABETES MELLITUS (H): ICD-10-CM

## 2021-06-17 DIAGNOSIS — Z60.3 LANGUAGE BARRIER AFFECTING HEALTH CARE: ICD-10-CM

## 2021-06-17 DIAGNOSIS — Z12.11 COLON CANCER SCREENING: ICD-10-CM

## 2021-06-17 DIAGNOSIS — D12.6 TUBULAR ADENOMA OF COLON: ICD-10-CM

## 2021-06-17 LAB — HBA1C MFR BLD: 8.5 %

## 2021-06-17 SDOH — SOCIAL STABILITY - SOCIAL INSECURITY: ACCULTURATION DIFFICULTY: Z60.3

## 2021-06-17 ASSESSMENT — MIFFLIN-ST. JEOR: SCORE: 1097.31

## 2021-06-17 NOTE — PROGRESS NOTES
Assessment: /    Plan:    1. Acute left-sided low back pain without sciatica  ibuprofen (ADVIL,MOTRIN) 800 MG tablet   2. Dysuria  Urinalysis-UC if Indicated       No bending at the waist.  Recheck if any problem.  Patient was seen with professional Valorie , Amber Lujan.      Subjective:    HPI:  Marco Khan is a 52-year-old female resenting with low back pain.  This is been occurring for 2 weeks, on the left side.  She does some bending to the left.  She has been taking 2 Tylenol 3 times daily which helps slightly.  She is applying heat.  She sleeps poorly as a result of the pain.  She usually does not take amitriptyline because it causes her to be too sleepy the next day.  She takes a 10 mg dose only on nights that she will not work the following day.    She has dysuria.       Review of Systems: No leg weakness, nausea or vomiting.        Current Outpatient Prescriptions   Medication Sig Dispense Refill     acetaminophen (MAPAP EXTRA STRENGTH) 500 MG tablet TAKE 1 TABLET (500 MG TOTAL)  BY MOUTH 2 (TWO) TIMES A DAY AS NEEDED FOR PAIN. 90 tablet 5     amitriptyline (ELAVIL) 10 MG tablet Take 1 tablet (10 mg total) by mouth at bedtime. 30 tablet 11     blood glucose test (CONTOUR NEXT STRIPS) strips Use 1 each As Directed 2 (two) times a day. Dispense brand per patient's insurance at pharmacy discretion. 100 strip 3     dimethicone-colloidal oatmeal (AVEENO) 1.3 % Lotn Apply to skin daily 532 mL 11     generic lancets (FINGERSTIX LANCETS) Dispense brand per patient's insurance at pharmacy discretion. 50 each 5     lisinopril (PRINIVIL,ZESTRIL) 10 MG tablet Take 1 tablet (10 mg total) by mouth daily. 30 tablet 11     metFORMIN (GLUCOPHAGE XR) 750 MG 24 hr tablet Take 1 tablet (750 mg total) by mouth daily with supper. 30 tablet 11     VITAMIN D2 50,000 unit capsule Take 1 capsule by mouth once a week.       ibuprofen (ADVIL,MOTRIN) 800 MG tablet Take 1 tablet (800 mg total) by mouth 3 (three) times a day as needed  for pain. 60 tablet 2     No current facility-administered medications for this visit.          Objective:    Vitals:    04/25/18 1147   BP: 106/70   Pulse: 72   Resp: 17   Temp: 98.3  F (36.8  C)   SpO2: 98%       Gen:  NAD, VSS  Lungs:  normal  Heart:  normal  Abdomen:  No HSM, mass or tenderness  Back with tenderness of the lumbar left paraspinal muscles.  No midline lumbar tenderness.  No CVA tenderness.  Left straight leg raise leads to low back pain.  Legs with normal strength and sensation.    UA negative      ADDITIONAL HISTORY SUMMARIZED (2): None.  DECISION TO OBTAIN EXTRA INFORMATION (1): None.   RADIOLOGY TESTS (1): None.  LABS (1): UA.  MEDICINE TESTS (1): None.  INDEPENDENT REVIEW (2 each): None.     Total Data Points: 1

## 2021-06-17 NOTE — PATIENT INSTRUCTIONS - HE
"Patient Instructions by Marvin Barry MD at 4/6/2019 12:30 PM     Author: Marvin Barry MD Service: -- Author Type: Resident    Filed: 4/6/2019  2:13 PM Encounter Date: 4/6/2019 Status: Signed    : Marvin Barry MD (Resident)       Patient Education     Viral Syndrome (Adult)  A viral illness may cause a number of symptoms. The symptoms depend on the part of the body that the virus affects. If it settles in your nose, throat, and lungs, it may cause cough, sore throat, congestion, and sometimes headache. If it settles in your stomach and intestinal tract, it may cause vomiting and diarrhea. Sometimes it causes vague symptoms like \"aching all over,\" feeling tired, loss of appetite, or fever.  A viral illness usually lasts 1 to 2 weeks, but sometimes it lasts longer. In some cases, a more serious infection can look like a viral syndrome in the first few days of the illness. You may need another exam and additional tests to know the difference. Watch for the warning signs listed below.  Home care  Follow these guidelines for taking care of yourself at home:    If symptoms are severe, rest at home for the first 2 to 3 days.    Stay away from cigarette smoke - both your smoke and the smoke from others.    You may use over-the-counter acetaminophen or ibuprofen for fever, muscle aching, and headache, unless another medicine was prescribed for this. If you have chronic liver or kidney disease or ever had a stomach ulcer or GI bleeding, talk with your doctor before using these medicines. No one who is younger than 18 and ill with a fever should take aspirin. It may cause severe disease or death.    Your appetite may be poor, so a light diet is fine. Avoid dehydration by drinking 8 to 12 8-ounce glasses of fluids each day. This may include water; orange juice; lemonade; apple, grape, and cranberry juice; clear fruit drinks; electrolyte replacement and sports drinks; and decaffeinated teas and coffee. If you " have been diagnosed with a kidney disease, ask your doctor how much and what types of fluids you should drink to prevent dehydration. If you have kidney disease, drinking too much fluid can cause it build up in the your body and be dangerous to your health.    Over-the-counter remedies won't shorten the length of the illness but may be helpful for cough, sore throat; and nasal and sinus congestion. Don't use decongestants if you have high blood pressure.  Follow-up care  Follow up with your healthcare provider if you do not improve over the next week.  Call 911  Call 911 if any of the following occur:    Convulsion    Feeling weak, dizzy, or like you are going to faint    Chest pain, shortness of breath, wheezing, or difficulty breathing  When to seek medical advice  Call your healthcare provider right away if any of these occur:    Cough with lots of colored sputum (mucus) or blood in your sputum    Chest pain, shortness of breath, wheezing, or difficulty breathing    Severe headache; face, neck, or ear pain    Severe, constant pain in the lower right side of your belly (abdominal)    Continued vomiting (cant keep liquids down)    Frequent diarrhea (more than 5 times a day); blood (red or black color) or mucus in diarrhea    Feeling weak, dizzy, or like you are going to faint    Extreme thirst    Fever of 100.4 F (38 C) or higher, or as directed by your healthcare provider  Date Last Reviewed: 9/25/2015 2000-2017 [employer]. [address] [place] [address], [place] [address]. All rights reserved. This information is not intended as a substitute for professional medical care. Always follow your healthcare professional's instructions.

## 2021-06-18 NOTE — PROGRESS NOTES
HPI - 51 yo female here for DM check.    DM  A1c 7.6 on 6/22/17-  7.9 started metformin XR 750mg on 10/9/17 -> 6.8 on 1/11/18 - > 7.2  Discussed smaller amounts of rice and more vegetables  BP wnl today but reports taking lisinopril prn b/c it makes her feel tired  LDL 72 on 6/22/17 (with elevated TG)  Eye exam 3/22/17  CMP wnl 1/11/18  microalbuminuria - abnl 12/2016 and  wnl 6/22/18 - on lisinopril      HTN -   BP unusually elevated last visit  But is ok today  Last visit she was advised to stop ibuprofen but she continued to use prn for back pain    H/o vit D deficiency -   Last level 30.7 on 12/13/16 - > 28.0 -> 45.7 on 1/11/18  on ergo      Headaches and body pain - improved with starting amitriptyline for prophylaxis       Tired from working extra hours  - 11hrs/day at [employer] [facility]    Current Outpatient Prescriptions   Medication Sig Note     acetaminophen (MAPAP EXTRA STRENGTH) 500 MG tablet TAKE 1 TABLET (500 MG TOTAL)  BY MOUTH 2 (TWO) TIMES A DAY AS NEEDED FOR PAIN.      amitriptyline (ELAVIL) 10 MG tablet Take 1 tablet (10 mg total) by mouth at bedtime.      blood glucose test (CONTOUR NEXT STRIPS) strips Use 1 each As Directed 2 (two) times a day. Dispense brand per patient's insurance at pharmacy discretion.      generic lancets (FINGERSTIX LANCETS) Dispense brand per patient's insurance at pharmacy discretion.      ibuprofen (ADVIL,MOTRIN) 800 MG tablet Take 1 tablet (800 mg total) by mouth 3 (three) times a day as needed for pain.      lisinopril (PRINIVIL,ZESTRIL) 10 MG tablet Take 1 tablet (10 mg total) by mouth daily.      metFORMIN (GLUCOPHAGE XR) 750 MG 24 hr tablet Take 1 tablet (750 mg total) by mouth daily with supper.      dimethicone-colloidal oatmeal (AVEENO) 1.3 % Lotn Apply to skin daily      VITAMIN D2 50,000 unit capsule Take 1 capsule by mouth once a week. 1/11/2018: Received from: External Pharmacy     Vitals:    05/15/18 1642   BP: 130/86   Pulse: 64   Resp: 16   Temp: 98.1  F  "(36.7  C)   TempSrc: Oral   SpO2: 96%   Weight: 140 lb 11.2 oz (63.8 kg)   Height: 4' 11.84\" (1.52 m)     PHYSICAL EXAM   General Appearance: Awake and alert, in no acute distress  HEENT: neck is supple  CV: regular rate  Resp: No respiratory distress. Breathing comfortably  Musculoskeletal: moving limbs comfortably with not deficits or deformities  Skin: no rashes noted  DM foot exam: normal pedal pulses, no deformities, good sensation to microfilament, no callouses      A/P  DM  A1c 7.6 on 6/22/17-  7.9 started metformin XR 750mg on 10/9/17 -> 6.8 on 1/11/18 - > 7.2  Discussed smaller amounts of rice and more vegetables  BP wnl today but reports taking lisinopril prn b/c it makes her feel tired  LDL 72 on 6/22/17 (with elevated TG)  Eye exam 3/22/17  CMP wnl 1/11/18  microalbuminuria - abnl 12/2016 and  wnl 6/22/18 - on lisinopril      HTN - and ACEI related cough  Stop lisinopril  Start losartan 50mg        H/o vit D deficiency -   Last level 30.7 on 12/13/16 - > 28.0 -> 45.7 on 1/11/18  on ergo      Headaches and body pain - improved with starting amitriptyline for prophylaxis   Advised again to stop ibuprofen b/c of HTN       Dry skin - rx aveeno    Tired from working extra hours  - 11hrs/day at [employer]    RTC for Px with mammo (last 2015) and pap (last 2013)    Spent 25 min face to face with patient with more the 50% spent in counseling, reviewing chart, and coordination of care and discussing problems listed above.     "

## 2021-06-18 NOTE — LETTER
Letter by Jasmyne Moctezuma MD at      Author: Jasmyen Moctezuma MD Service: -- Author Type: --    Filed:  Encounter Date: 3/7/2019 Status: (Other)       March 7, 2019     Patient: Marco Khan   YOB: 1965   Date of Visit: 3/7/2019       To Whom it May Concern:    Marco Khan was seen in my clinic on 3/7/2019.    If you have any questions or concerns, please don't hesitate to call.    Sincerely,         Electronically signed by Jasmyne Moctezuma MD

## 2021-06-19 NOTE — LETTER
Letter by Marvin Barry MD at      Author: Marvin Barry MD Service: -- Author Type: --    Filed:  Encounter Date: 4/6/2019 Status: (Other)         April 6, 2019     Patient: Marco Khan   YOB: 1965   Date of Visit: 4/6/2019       To Whom It May Concern:    It is my medical opinion that Marco Khan may return to full duty immediately with no restrictions.    If you have any questions or concerns, please don't hesitate to call.    Sincerely,        Electronically signed by Marvin Barry MD

## 2021-06-19 NOTE — LETTER
Letter by Jasmyne Moctezuma MD at      Author: Jasymne Moctezuma MD Service: -- Author Type: --    Filed:  Encounter Date: 6/17/2019 Status: (Other)         June 17, 2019     Patient: Marco Khan   YOB: 1965   Date of Visit: 6/17/2019       To Whom it May Concern:    Marco Khan was seen in my clinic on 6/17/2019.    If you have any questions or concerns, please don't hesitate to call.    Sincerely,         Electronically signed by Jasmyne Moctezuma MD

## 2021-06-19 NOTE — PROGRESS NOTES
Subjective:      Marco Khan is a 53 y.o. female who presents for an annual exam.     Concerns:  Left wrist lump that started a month ago. Not painful unless it is pressed. Not gettting bigger.     DM  A1c  7.2 on 5/15/18 on metformin XR 750mg   Discussed smaller amounts of rice and more vegetables  BP wnl today  LDL 72 on 17 (with elevated TG)  Eye exam 3/22/17  CMP wnl 18  microalbuminuria - abnl 2016 and  wnl 17-  losartan      HTN - and ACEI related cough  Stop lisinopril -> Start losartan 50mg          H/o vit D deficiency -   Last level 30.7 on 16 - > 28.0 -> 45.7 on 18    H/o thrombocytosis in  and         Headaches and body pain - improved with starting amitriptyline for prophylaxis   Advised again to stop ibuprofen b/c of HTN - she now is taking tylenol instead          Immunization History   Administered Date(s) Administered     Hep A, historic 2010, 2011     Hep B, historic 2010, 2011     Influenza, inj, historic,unspecified 2011, 10/11/2011, 2014     Influenza, seasonal,quad inj 36+ mos 2016, 2017     Influenza, seasonal,quad inj 6-35 mos 2012     MMR 2010, 2010     Td,adult,historic,unspecified 2010, 2011     Tdap 2010     Varicella 2010, 2011         Gynecologic History  Patient's last menstrual period was 2015.  Contraception: tubal   Last Pap:  nl and HPV neg  Last mammogram:  neg and today pending      OB History    Para Term  AB Living   6 6 6      SAB TAB Ectopic Multiple Live Births             # Outcome Date GA Lbr J Luis/2nd Weight Sex Delivery Anes PTL Lv   6 Term            5 Term            4 Term            3 Term            2 Term            1 Term                   Current Outpatient Prescriptions   Medication Sig Dispense Refill     acetaminophen (MAPAP EXTRA STRENGTH) 500 MG tablet TAKE 1 TABLET (500 MG TOTAL)  BY MOUTH 2 (TWO)  TIMES A DAY AS NEEDED FOR PAIN. 90 tablet 5     amitriptyline (ELAVIL) 10 MG tablet Take 1 tablet (10 mg total) by mouth at bedtime. 30 tablet 11     blood glucose test (CONTOUR NEXT STRIPS) strips Use 1 each As Directed 2 (two) times a day. Dispense brand per patient's insurance at pharmacy discretion. 100 strip 3     generic lancets (FINGERSTIX LANCETS) Dispense brand per patient's insurance at pharmacy discretion. 50 each 5     losartan (COZAAR) 50 MG tablet Take 1 tablet (50 mg total) by mouth 2 (two) times a day. 60 tablet 11     metFORMIN (GLUCOPHAGE XR) 750 MG 24 hr tablet Take 1 tablet (750 mg total) by mouth daily with supper. 30 tablet 11     dimethicone-colloidal oatmeal (AVEENO) 1.3 % Lotn Apply to skin daily 532 mL 11     ibuprofen (ADVIL,MOTRIN) 800 MG tablet Take 1 tablet (800 mg total) by mouth 3 (three) times a day as needed for pain. 60 tablet 2     VITAMIN D2 50,000 unit capsule Take 1 capsule by mouth once a week.       No current facility-administered medications for this visit.      No past medical history on file.  Past Surgical History:   Procedure Laterality Date     NV LIGATE FALLOPIAN TUBE      Description: Tubal Ligation;  Recorded: 05/20/2013;  Comments: in [place], 1999     Review of patient's allergies indicates no known allergies.  Family History   Problem Relation Age of Onset     Breast cancer Neg Hx      Social History     Social History     Marital status:      Spouse name: N/A     Number of children: N/A     Years of education: N/A     Occupational History     Not on file.     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        Lives with  and 6 kids         REVIEW OF  "SYSTEMS  General: no weight changes, fatigue  HEENT:  no HA,  no vision changes, URI sx  Respiratory:  no cough, dyspnea  Cardiovascular: no chest pain, palpitations  Gastrointestinal: no nausea/vomiting, diarrhea/constipation, melena  : no dysuria, no vaginal d/c  Neurologic: no seizures, focal weakness, tremors  Skin: no rashes             Objective:         Vitals:    08/02/18 1259   BP: 118/62   Pulse: (!) 57   Resp: 14   Temp: 97.6  F (36.4  C)   TempSrc: Oral   SpO2: 97%   Weight: 142 lb (64.4 kg)   Height: 4' 11.65\" (1.515 m)         OBJECTIVE:  /62  Pulse (!) 57  Temp 97.6  F (36.4  C) (Oral)   Resp 14  Ht 4' 11.65\" (1.515 m)  Wt 142 lb (64.4 kg)  LMP 04/11/2015  SpO2 97%  BMI 28.06 kg/m2  Body mass index is 28.06 kg/(m^2).  General appearance: well groomed, pleasant, well hydrated, nontoxic appearing  ENT: PERRL, throat clear  Neck: neck supple, no lymphadenopathy, no thyromegaly  Lungs: lungs clear to auscultation bilaterally, no wheezes or rhonchi  Heart: regular rate and rhythm, no murmurs, rubs or gallops  Abdomen: soft, nontender  Neuro: no focal deficits, CN II-XII grossly intact, alert and oriented  Psych:  mood stable, appears to have good insight and judgment  Pelvic exam:   External genitalia: normal  Vaginal discharge: none  Cervix: no inflammation, discharge, bleeding or lesions noted.  Bimanual exam: No cervical motion tenderness. No masses  BREAST EXAM: normal without suspicious masses, skin or nipple changes or axillary nodes, self-exam is taught and discussed            Assessment / Plan:      Healthy female exam.    Patient's last menstrual period was 04/11/2015. - menopause  Contraception: tubal   Last Pap: 2013 nl and HPV neg  Last mammogram: 2015 neg and today pending  [facility] wnl 1/2018        DM well controlled   A1c  7.2 on 5/15/18 on metformin XR 750mg   BP wnl today  LDL 72 on 6/22/17 (with elevated TG) - recheck   Eye exam 6/1/2018  [facility] wnl 1/11/18  microalbuminuria - " abnl 12/2016 and  wnl 6/22/17-  Losartan - check       HTN - well controlled with losartan 50mg BID          H/o vit D deficiency -   Last level 30.7 on 12/13/16 - > 28.0 -> 45.7 on 1/11/18  Recheck    H/o thrombocytosis in 2014 and 2016  Recheck         Headaches and body pain - improved with starting amitriptyline for prophylaxis     Left wrist ganglion cyst

## 2021-06-20 NOTE — PROGRESS NOTES
"SUBJECTIVE  Marco Khan is a 53 y.o. female here for:    L wrist bump: 2 month duration. She was seen by PCP and diagnosed with ganglion cyst. Painful only with certain movements, like flexion and extension of wrist. Works at [employer] (formerly [employer])- has noticed pain is worse with lifting. She is able to work. No numbness or tingling. No other joint pain. Has never had this before. Has not used anything for pain.     Health maintenance: due for flu shot    ROS  Complete 10 point review of systems negative except as noted above in HPI    Reviewed Past Medical History, Medications, Family History and Social History in Epic and up to date with no new changes.    OBJECTIVE  /74  Pulse 70  Temp 98.4  F (36.9  C) (Oral)   Resp 12  Ht 4' 11.65\" (1.515 m)  Wt 145 lb (65.8 kg)  LMP 04/11/2015  SpO2 99%  BMI 28.65 kg/m2     General: Cooperative, pleasant, in no acute distress  Ext: Radial pulses intact, no swelling/edema  MSK: Strength in wrist, grasp strength equal and 5/5 bilaterally.  Neuro: No sensation deficits  Skin: Approximately 1 cm, round, mobile, non-tender lesion on dorsal aspect of left wrist      ASSESSMENT/PLAN:   Pa was seen today for wrist pain.    Diagnoses and all orders for this visit:    Ganglion cyst of wrist, left: History and exam consistent with ganglion cyst. She is having some minimal tenderness with wrist extension; however she has not tried any conservative measures. Encouraged using tylenol as needed and if no improvement in her symptoms or enlargement of the cyst, return to clinic. I did discuss other options including cyst aspiration/drainage vs surgical removement and she declines referrals for these at this time.     Need for immunization against influenza  -     Influenza, Seasonal,Quad Inj, 36+ MOS      RTC if symptoms worsen.    Visit was completed along with [facility]     Options for treatment and follow-up care were reviewed with the patient. Marco Khan and/or guardian was engaged and " actively involved in the decision making process. Pa Bill and/or guardian verbalized understanding of the options discussed and was satisfied with the final plan.      Taylor De Guzman MD

## 2021-06-21 NOTE — PROGRESS NOTES
HPI - 52 yo female here to f/u on DM and f/u on walk-in clinic for chest pain      She was seen at [facility] on 11/2/18 for a work comp related injury. \  Given high dose ibuprofen 600mg three times a day for pain but she has not started this b/c the paperwork she was given at the [facility] was given to her employer and she did not have any paper to take to the pharmacy. She has been using tylenol only.   She was given work restriction for light duties.   She reports still having pain when pushing and pulling.     DM well controlled   A1c  7.2 on 5/15/18 on metformin XR 750mg   BP wnl today  LDL 72 on 6/22/17 (with elevated ) - 90 and 347 on 8/2/18  Eye exam 6/1/2018  CMP wnl 1/11/18  microalbuminuria - abnl 12/2016 and  wnl 6/22/17- and 8/2/18 on  Losartan -       HTN - well controlled with losartan 50mg two times a day but sometime she forgets the evening dose      H/o vit D deficiency -   Last level 30.7 on 12/13/16 - > 28.0 -> 45.7 on 1/11/18 -> 21.9 on 8/2/18       H/o thrombocytosis in 2014 and 2016  hgb 16.4-> 15.4 -> 16.1    Current Outpatient Prescriptions   Medication Sig     acetaminophen (MAPAP EXTRA STRENGTH) 500 MG tablet TAKE 1 TABLET (500 MG TOTAL)  BY MOUTH 2 (TWO) TIMES A DAY AS NEEDED FOR PAIN.     amitriptyline (ELAVIL) 10 MG tablet Take 1 tablet (10 mg total) by mouth at bedtime.     generic lancets (FINGERSTIX LANCETS) Dispense brand per patient's insurance at pharmacy discretion.     metFORMIN (GLUCOPHAGE XR) 750 MG 24 hr tablet Take 1 tablet (750 mg total) by mouth daily with supper.     ONETOUCH ULTRA BLUE TEST STRIP strips TEST 2 TIMES A DAY     dimethicone-colloidal oatmeal (AVEENO) 1.3 % Lotn Apply to skin daily     losartan (COZAAR) 50 MG tablet Take 1 tablet (50 mg total) by mouth 2 (two) times a day.     Vitals:    11/06/18 1317   BP: 122/72   Patient Site: Right Arm   Patient Position: Sitting   Cuff Size: Adult Regular   Pulse: 64   Resp: 16   Temp: 97.8  F (36.6  C)  "  TempSrc: Oral   SpO2: 94%   Weight: 145 lb (65.8 kg)   Height: 4' 11.75\" (1.518 m)     PHYSICAL EXAM   General Appearance: Awake and alert, in no acute distress  HEENT: neck is supple  CV: regular rate  Resp: No respiratory distress. Breathing comfortably  Musculoskeletal: moving limbs comfortably with not deficits or deformities  Skin: mild chest wall pain with palpation of sternum    A/P  Workers comp - chest wall pain   Given new rx for ibuprofen three times a day for 14 days and advised to take with food  Given work not to continue light duty for 14 days    DM well controlled   A1c  7.2 on 5/15/18 on metformin XR 750mg  - check A1c today  BP wnl today  LDL 72 on 6/22/17 (with elevated ) - 90 and 347 on 8/2/18  Eye exam 6/1/2018  CMP wnl 1/11/18  microalbuminuria - abnl 12/2016 and  wnl 6/22/17- and 8/2/18 on  Losartan -       HTN - well controlled but will change from 50mg two times a day to losartan 100mg daily      H/o vit D deficiency -   Last level 21.9 on 8/2/18  Continue ergo 62674 weekly       H/o thrombocytosis in 2014 and 2016  Non smoker  hgb 16.4-> 15.4 -> 16.1  Continue to monitor    Spent 40 min face to face with patient with more the 50% spent in counseling, reviewing chart, and coordination of care and discussing problems listed above.     "

## 2021-06-23 ENCOUNTER — AMBULATORY - HEALTHEAST (OUTPATIENT)
Dept: LAB | Facility: CLINIC | Age: 56
End: 2021-06-23

## 2021-06-23 DIAGNOSIS — D12.6 TUBULAR ADENOMA OF COLON: ICD-10-CM

## 2021-06-23 DIAGNOSIS — Z12.11 COLON CANCER SCREENING: ICD-10-CM

## 2021-06-23 LAB
HEMOCCULT SP1 STL QL: NEGATIVE
HEMOCCULT SP2 STL QL: NEGATIVE
HEMOCCULT SP3 STL QL: NEGATIVE

## 2021-06-23 NOTE — TELEPHONE ENCOUNTER
Refill Approved    Rx renewed per Medication Renewal Policy. Medication was last renewed on 11/6/18.    Irma Husain, [facility] Triage/Med Refill 1/28/2019     Requested Prescriptions   Pending Prescriptions Disp Refills     generic lancets (MICROLET LANCET) [Pharmacy Med Name: MICROLET LANCETS] 100 each 0     Sig: USE TO TEST 2 TIMES A DAY    Diabetic Supplies Refill Protocol Passed - 1/25/2019  4:42 PM       Passed - Visit with PCP or prescribing provider visit in last 6 months    Last office visit with prescriber/PCP: 11/6/2018 Jasmyne Moctezuma MD OR same dept: 11/6/2018 Jasmyne Moctezuma MD OR same specialty: 11/6/2018 Jasmyne Moctezuma MD  Last physical: 8/2/2018 Last MTM visit: Visit date not found   Next visit within 3 mo: Visit date not found  Next physical within 3 mo: Visit date not found  Prescriber OR PCP: Jasmyne Moctezuma MD  Last diagnosis associated with med order: 1. Controlled type 2 diabetes mellitus without complication, without long-term current use of insulin (H)  - MICROLET LANCET [Pharmacy Med Name: MICROLET LANCETS]; USE TO TEST 2 TIMES A DAY  Dispense: 100 each; Refill: 0    If protocol passes may refill for 12 months if within 3 months of last provider visit (or a total of 15 months).            Passed - A1C in last 6 months    Hemoglobin A1c   Date Value Ref Range Status   11/06/2018 7.6 (H) 3.5 - 6.0 % Final

## 2021-06-24 NOTE — PROGRESS NOTES
"HPI - 54 yo female here for DM check.       DM Type 2  A1c  7.2 on 5/15/18 on metformin XR 750mg once daily -> 7.6 on 11/6/18 -> 7.9 today  Home -180  BP wnl today  LDL 72 on 6/22/17 (with elevated ) - 90 and 347 on 8/2/18  Eye exam 6/1/2018  CMP wnl 1/11/18  microalbuminuria - abnl 12/2016 and  wnl 6/22/17- and 8/2/18 on  Losartan -       HTN - well controlled but will change from 50mg two times a day to losartan 100mg daily      H/o vit D deficiency -   Last level 21.9 on 8/2/18  Continue ergo 12481 weekly        H/o  polycythemia in 2014 and 2016  Non smoker  hgb 16.4-> 15.4 -> 16.1  Continue to monitor    Current Outpatient Medications   Medication Sig     acetaminophen (MAPAP EXTRA STRENGTH) 500 MG tablet TAKE 1 TABLET (500 MG TOTAL)  BY MOUTH 2 (TWO) TIMES A DAY AS NEEDED FOR PAIN.     amitriptyline (ELAVIL) 10 MG tablet TAKE ONE TABLET BY MOUTH DAILY AT BEDTIME.     dimethicone-colloidal oatmeal (AVEENO) 1.3 % Lotn Apply to skin daily     ergocalciferol (ERGOCALCIFEROL) 50,000 unit capsule Take 1 capsule (50,000 Units total) by mouth once a week.     generic lancets (MICROLET LANCET) USE TO TEST 2 TIMES A DAY     losartan (COZAAR) 100 MG tablet Take 1 tablet (100 mg total) by mouth daily.     metFORMIN (GLUCOPHAGE-XR) 750 MG 24 hr tablet TAKE ONE TABLET BY MOUTH DAILY WITH SUPPER     ONETOUCH DELICA LANCETS 33 gauge Misc Test twice daily     ONETOUCH ULTRA BLUE TEST STRIP strips TEST 2 TIMES A DAY     Vitals:    03/07/19 1604   BP: 110/64   Pulse: (!) 57   Resp: 12   Temp: 98  F (36.7  C)   TempSrc: Oral   SpO2: 97%   Weight: 144 lb 4.8 oz (65.5 kg)   Height: 4' 11.53\" (1.512 m)     PHYSICAL EXAM   General Appearance: Awake and alert, in no acute distress  HEENT: neck is supple  CV: regular rate  Resp: No respiratory distress. Breathing comfortably  Musculoskeletal: moving limbs comfortably with not deficits or deformities  Skin: no rashes noted    A/P  DM not as well controlled  A1c 7.9 " today  Increase [id number] XL two times a day with meals  BP wnl today on losartan  LDL 72 on 6/22/17 (with elevated ) - 90 and 347 on 8/2/18  Eye exam 6/1/2018  microalbuminuria - wnl  8/2/18 on  Losartan   Discussed referral to DM Ed   Handouts in [place] given       HTN - well controlled on losartan 100mg daily      H/o vit D deficiency -   Continue ergo 21085 weekly  Check level today        H/o polycythemia  - check cbc    Spent 25 min face to face with patient with more the 50% spent in counseling, reviewing chart, and coordination of care and discussing problems listed above.       Recent Results (from the past 1008 hour(s))   Glycosylated Hemoglobin A1c    Collection Time: 03/07/19  3:57 PM   Result Value Ref Range    Hemoglobin A1c 7.9 (H) 3.5 - 6.0 %   HM1 (CBC with Diff)    Collection Time: 03/07/19  4:17 PM   Result Value Ref Range    WBC 5.5 4.0 - 11.0 thou/uL    RBC 5.26 3.80 - 5.40 mill/uL    Hemoglobin 14.8 12.0 - 16.0 g/dL    Hematocrit 44.0 35.0 - 47.0 %    MCV 84 80 - 100 fL    MCH 28.2 27.0 - 34.0 pg    MCHC 33.7 32.0 - 36.0 g/dL    RDW 12.6 11.0 - 14.5 %    Platelets 188 140 - 440 thou/uL    MPV 8.6 7.0 - 10.0 fL    Neutrophils % 69 50 - 70 %    Lymphocytes % 22 20 - 40 %    Monocytes % 6 2 - 10 %    Eosinophils % 2 0 - 6 %    Basophils % 1 0 - 2 %    Neutrophils Absolute 3.8 2.0 - 7.7 thou/uL    Lymphocytes Absolute 1.2 0.8 - 4.4 thou/uL    Monocytes Absolute 0.3 0.0 - 0.9 thou/uL    Eosinophils Absolute 0.1 0.0 - 0.4 thou/uL    Basophils Absolute 0.0 0.0 - 0.2 thou/uL

## 2021-06-25 NOTE — PROGRESS NOTES
Progress Notes by Chu Ramirez, Diabetes Ed at 9/6/2017  3:00 PM     Author: Chu Ramirez, Diabetes Ed Service: -- Author Type: Diabetes Ed    Filed: 9/8/2017  8:04 AM Encounter Date: 9/6/2017 Status: Attested    : Chu Ramirez, Diabetes Ed (Diabetes Ed) Cosigner: Jasmyne Moctezuma MD at 9/11/2017  1:34 PM    Attestation signed by Jasmyne Moctezuma MD at 9/11/2017  1:34 PM    Reviewed and agree with plan.   Dr. Jasmyne Moctezuma  9/11/2017                    Assessment: Pa is in for education on diabetes management.  Her last a1c shot up from 6.2 to 7.6 for some reason that's hard to pinpoint.  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  Today I went over diabetes pathophysiology,  A1C & bg goals, preventing future complications, hyperglycemia S&S, portion sizing, carb counting, and when to test in a day.  She was very hesitant, but successfully checked her blood sugar at our appointment.  It was 134 three hours after eating.  She reports she doesn't drink juice or soda.  She described her meal portions as 1-2 cups of rice and no more.   I discussed limiting carbs to 45-60g per meal on average.  I encouraged exercise as a daily way to lower blood sugars and improve her health.  I discussed starting metformin xr 500mg today, but she would like to wait until her doctor's appointment and new a1c before starting.  We discussed keeping her a1c at least <8.0 and aiming for a goal of <7.0.  She will check her blood sugars 3-5 days per week in the morning before breakfast when she picks it up at the pharmacy.      Plan: Pa will working on reducing her carbohydrates at meals to 45-60g.  She will walk & exercise more to help reduce her blood sugars.  When she receives her meter she will check her blood sugar 3-5x/week to see if she is reaching BG goal of <130 before breakfast.  She has a follow up with her Dr. Moctezuma in October to see if her a1c has  improved with diet an exercise.  I did recommend starting 500mg of metformin xr, but she would like to discuss this with her doctor before starting.    Subjective and Objective:      Pa Bill is referred by Jasmyne Moctezuma for Diabetes Education.     Lab Results   Component Value Date    HGBA1C 7.6 (H) 06/22/2017         Current diabetes medications:  none    Goals     ? Blood sugars            9/6/17:  Pa will check her blood sugars at least 3-4x/weekly to see if she is reaching blood sugars goals without medication.      ? Reduce a1c w/diet & exercise            9/7/17:  Pa would like to reduce her a1c with diet and exercise.  She will aim to get her next a1c <7.5.            Follow up:   Primary care visit  CDE (certified diabetic educator)      Education:     Monitoring   Meter (per above goals): Assessed, Discussed and Literature provided  Monitoring: Assessed, Discussed and Literature provided  BG goals: Assessed, Discussed and Literature provided    Nutrition Management  Nutrition Management: Assessed, Discussed and Literature provided  Weight: Assessed and Discussed  Portions/Balance: Assessed, Discussed and Literature provided  Carb ID/Count: Assessed, Discussed and Literature provided  Label Reading: Not addressed  Heart Healthy Fats: Not addressed  Menu Planning: Assessed and Discussed  Dining Out: Not addressed  Physical Activity: Assessed and Discussed  Medications: Assessed and Discussed  Orals: Assessed and Discussed  Injected Medications: Not addressed   Storage/Exp:Not addressed   Site Rotation: Not addressed   Sites Assessed: no    Diabetes Disease Process: Assessed, Discussed and Literature provided    Acute Complications: Prevent, Detect, Treat:  Hypoglycemia: Not addressed  Hyperglycemia: Assessed and Discussed  Sick Days: Not addressed  Driving: Not addressed    Chronic Complications  Foot Care:Assessed and Discussed  Skin Care: Not addressed  Eye: Assessed and Discussed  ABC: Assessed and  Discussed  Teeth:Not addressed  Goal Setting and Problem Solving: Assessed and Discussed  Barriers: Assessed and Discussed  Psychosocial Adjustments: Assessed and Discussed      Time spent with the patient: 60 minutes for diabetes education and counseling.   Previous Education: no  Visit Type:DSMT  Hours Remaining: DSMT 9 and MNT 3      Chu Ramirez  9/7/2017
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
## 2021-06-26 NOTE — PROGRESS NOTES
"    Assessment & Plan     Poorly controlled type 2 diabetes mellitus (H)  A1c is increasing. Patient reports eating a lot of sweet fruits. She would prefer to work on diet than adding new medication.   Her glucometer is not working and wants assistance with DM ED to help with this. She works variable 12 hour shifts, so needs to be called on her days off - in June she is off 6/ 21, 22, 25 and 30  - Glycosylated Hemoglobin A1c  - JIC RED  - Glycosylated Hemoglobin A1c  - Ambulatory referral to Diabetes Education Program (CDE)    Colon cancer screening  Tubular adenoma of colon on colonscopy 7/2016 - repeat in 5 years  She cannot fit colonoscopy into her schedule, so agrees to FOBT for screening and will do colonoscopy next year when she has PTO.   - Occult Blood(ICT)    Language barrier affecting health care        Review of external notes as documented in note  Diagnosis or treatment significantly limited by social determinants of health - language barrier, low health literacy, poverty  Ordering of each unique test  37 minutes spent on the date of the encounter doing chart review, history and exam, documentation and further activities per the note    BMI:   Estimated body mass index is 25.95 kg/m  as calculated from the following:    Height as of this encounter: 4' 11.53\" (1.512 m).    Weight as of this encounter: 130 lb 12.8 oz (59.3 kg).     Return in about 3 months (around 9/17/2021) for DM f/u.    Jasmyne Moctezuma MD  [facility] GERI [facility] is 56 y.o. and presents today for the following health issues   HPI   [employer] professional phone  used.   Here for DM f/u and med check and HBV vaccine    Per chart review:   Screened for HBV/HCV  8/25/2010   Hep A and Hep C neg  HBV immune from sAb +, sAg -, core Ab neg   She had 2 vaccine in 2010 and 2011      Diabetes Mellitis Type 2  A1c increased from 7.0 on 6/17/19  to 7.6 on 10/3/19 to 8.4 on 1/30/20 -> 8.8 on 12/14/20 -> " "8.3 on 3/15/21 -> 8.5 today   Taking metformin 1000mg two times a day ( but sometimes only takes 1 time per day if \"blood sugar is too low 120 b/c when she works blood sugar drops to 58 and she feels tired - she does not eat much except a snack before work and then eats a meal at 9am)    increased metformin 3/15/21, referral to DM ED on 3/15/21 - unable to reach patien  BP normal   Microalbuminuria - normal on on 10/3/19 and 12/14/21 on losartan -    Eye exam on 8/303/19 at [facility], she has been waiting for pandemic settle down  CMP wnl 10/2019 and 12/14/20  LDL 98 on 10/3/19 - > 97 on 12/14/20  She has a very active job that are 12 hrs shift - 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     h/o tubular adenoma 7/2016, repeat 5yrs ordered 3/15/21 but patient then refused when called to schedule  She is worried about getting too tired for work, since she works 12 hour shifts. She is considering taking PTO for this test but she cannot take vacation until she has been at er new job for a year.     Review of Systems   Please see above.  The rest of the review of systems are negative for all systems.     Current Outpatient Medications   Medication Sig     ACCU-CHEK GEORGES PLUS TEST STRP strips TEST 2 TIMES A DAY     acetaminophen (MAPAP EXTRA STRENGTH) 500 MG tablet TAKE 1 TABLET (500 MG TOTAL)  BY MOUTH 2 (TWO) TIMES A DAY AS NEEDED FOR PAIN.     generic lancets (ACCU-CHEK SOFTCLIX LANCETS) USE TO TEST 2 TIMES A DAY     losartan (COZAAR) 100 MG tablet Take 1 tablet (100 mg total) by mouth daily.     metFORMIN (GLUCOPHAGE XR) 750 MG 24 hr tablet Take 2 tablets (1,500 mg total) by mouth daily.     ONETOUCH DELICA LANCETS 33 gauge Misc Test twice daily           Objective    /64   Pulse 66   Temp 97.5  F (36.4  C) (Oral)   Resp 12   Ht 4' 11.53\" (1.512 m)   Wt 130 lb 12.8 oz (59.3 kg)   LMP 04/11/2015   SpO2 98%   BMI 25.95 kg/m    Body mass index is 25.95 kg/m .  Physical " "Exam    Vitals:    06/17/21 1137   BP: 124/64   Pulse: 66   Resp: 12   Temp: 97.5  F (36.4  C)   TempSrc: Oral   SpO2: 98%   Weight: 130 lb 12.8 oz (59.3 kg)   Height: 4' 11.53\" (1.512 m)     PHYSICAL EXAM   General Appearance: Awake and alert, in no acute distress  HEENT: neck is supple  CV: regular rate  Resp: No respiratory distress. Breathing comfortably  Musculoskeletal: moving limbs comfortably with not deficits or deformities  Skin: no rashes noted      "

## 2021-06-27 NOTE — PROGRESS NOTES
"Progress Notes by Lisa Driver RN at 5/20/2019  3:30 PM     Author: Lisa Driver RN Service: -- Author Type: Registered Nurse    Filed: 5/22/2019 10:32 AM Encounter Date: 5/20/2019 Status: Attested    : Lisa Driver RN (Registered Nurse) Cosigner: Jasmyne Moctezuma MD at 5/28/2019  9:10 AM    Attestation signed by Jasmyne Moctezuma MD at 5/28/2019  9:10 AM    Reviewed and agree with plan.   Dr. Jasmyne Moctezuma  5/28/2019                    Assessment: pt seen today for DM education.  is present as well. Her metformin was increased at her last visit on 3/7/19, from 1 tab/day to 2 tabs/day. Pt states she was getting BG in the 60's so now she is only taking 1 tab/day. Reports BG before eating is 107-120 and after eating is 180-220. She did not bring in meter today. Metformin does not cause low BG however, no meter to confirm this. Pt also admits that she was \"not really\" taking her metformin prior to her last visit, and now she is.   Pt states she has been trying to watch her diet since her last visit. She states she was eating too big of portions. Now she is eating about 1 cup of rice/1 handful per meal and meat and veggies. Pt states she has cut sugar, cut bread, noodles and fruit from her diet. Discussed that she could and should be having some fruit, to just try to keep it to 1 piece of fruit or 1 cup per portion and to have as a snack in between meals.   Discussed activity. Pt states she is very active at work, working in a garden. By the time she gets home after an 8 hour day she is exhausted. Encouraged pt to try and stay active. Pt states she will notice if she checks her BG after work it will be lower.     Plan: continue to take metformin daily, do not stop taking it. Continue to be aware of portions of heavy carb foods. Ok to add fruit as snacks. Will have A1c next month, if continuing to elevate, will need to add the second tab of metformin.     Subjective and Objective:      Pa " [facility] is referred by Dr. Moctezuma for Diabetes Education.     Lab Results   Component Value Date    HGBA1C 7.9 (H) 03/07/2019         Current diabetes medications:  Metformin  mg two times a day       Follow up:   CDE 3 months       Education:     Monitoring   Meter (per above goals): Discussed  Monitoring: Assessed and Discussed  BG goals: Assessed and Discussed    Nutrition Management  Nutrition Management: Assessed and Discussed  Weight: Discussed  Portions/Balance: Assessed and Discussed  Carb ID/Count: Assessed and Discussed  Label Reading: Not addressed  Heart Healthy Fats: Assessed and Discussed  Menu Planning: Assessed and Discussed  Dining Out: Assessed and Discussed  Physical Activity: Assessed and Discussed  Medications: Assessed and Discussed    Diabetes Disease Process: Discussed    Acute Complications: Prevent, Detect, Treat:  Hypoglycemia: Assessed and Discussed  Hyperglycemia: Assessed and Discussed  Sick Days: Not addressed  Driving: Not addressed    Chronic Complications  Foot Care:Not addressed  Skin Care: Not addressed  Eye: Not addressed  ABC: Assessed and Discussed  Teeth:Not addressed  Goal Setting and Problem Solving: Assessed and Discussed  Barriers: Assessed and Discussed  Psychosocial Adjustments: Assessed and Discussed      Time spent with the patient: 30 minutes for diabetes education and counseling.   Previous Education: yes  Visit Type:DSMT  Hours Remaining: DSMT 1.5 and MNT 2      Lisa Driver  5/20/2019

## 2021-06-30 ENCOUNTER — COMMUNICATION - HEALTHEAST (OUTPATIENT)
Dept: FAMILY MEDICINE | Facility: CLINIC | Age: 56
End: 2021-06-30

## 2021-07-03 NOTE — ADDENDUM NOTE
Addendum Note by Anu Montejo RT (R) at 1/11/2018 11:46 AM     Author: Anu Montejo RT (R) Service: -- Author Type: Radiologic Technologist    Filed: 1/11/2018 11:46 AM Encounter Date: 1/11/2018 Status: Signed    : Anu Montejo RT (R) (Radiologic Technologist)    Addended by: ANU MONTEJO on: 1/11/2018 11:46 AM        Modules accepted: Orders

## 2021-07-06 VITALS
SYSTOLIC BLOOD PRESSURE: 124 MMHG | OXYGEN SATURATION: 98 % | BODY MASS INDEX: 25.68 KG/M2 | RESPIRATION RATE: 12 BRPM | TEMPERATURE: 97.5 F | HEIGHT: 60 IN | DIASTOLIC BLOOD PRESSURE: 64 MMHG | HEART RATE: 66 BPM | WEIGHT: 130.8 LBS

## 2021-07-22 NOTE — LETTER
Author: -- Service: -- Author Type: --    Filed:  Encounter Date: 6/30/2021 Status: (Other)       06/30/21  Re: Marco Khan  Birthday: 1965          Dear Colleague,      Thank you for your referral to the Diabetes Education Program. This is a notification to let you know that we were unable to reach the patient to arrange an appointment.     If you have any questions or concerns, please contact our office at [phone]        Sincerely,    Diabetes Education Scheduling

## 2021-08-03 PROBLEM — D75.839 THROMBOCYTOSIS: Status: RESOLVED | Noted: 2019-06-17 | Resolved: 2020-01-30

## 2021-08-17 ENCOUNTER — OFFICE VISIT (OUTPATIENT)
Dept: FAMILY MEDICINE | Facility: CLINIC | Age: 56
End: 2021-08-17
Payer: COMMERCIAL

## 2021-08-17 VITALS
HEIGHT: 60 IN | HEART RATE: 61 BPM | RESPIRATION RATE: 14 BRPM | BODY MASS INDEX: 25.45 KG/M2 | WEIGHT: 129.6 LBS | TEMPERATURE: 97.6 F | SYSTOLIC BLOOD PRESSURE: 104 MMHG | OXYGEN SATURATION: 98 % | DIASTOLIC BLOOD PRESSURE: 68 MMHG

## 2021-08-17 DIAGNOSIS — Z60.3 LANGUAGE BARRIER AFFECTING HEALTH CARE: ICD-10-CM

## 2021-08-17 DIAGNOSIS — Z75.8 LANGUAGE BARRIER AFFECTING HEALTH CARE: ICD-10-CM

## 2021-08-17 DIAGNOSIS — D12.6 TUBULAR ADENOMA OF COLON: ICD-10-CM

## 2021-08-17 DIAGNOSIS — E11.65 POORLY CONTROLLED TYPE 2 DIABETES MELLITUS (H): Primary | ICD-10-CM

## 2021-08-17 PROCEDURE — 99213 OFFICE O/P EST LOW 20 MIN: CPT | Performed by: FAMILY MEDICINE

## 2021-08-17 SDOH — SOCIAL STABILITY - SOCIAL INSECURITY: ACCULTURATION DIFFICULTY: Z60.3

## 2021-08-17 ASSESSMENT — MIFFLIN-ST. JEOR: SCORE: 1091.87

## 2021-08-17 NOTE — PROGRESS NOTES
Assessment & Plan     Poorly controlled type 2 diabetes mellitus (H)  Reports doing better with diet and medications  Too early to recheck A1c (<90 days)  Continue current regimen   F/u in 2-4 months to recheck and advised to bring bottles and glucometer    Tubular adenoma of colon  F/u FOBT neg  In 6/2021    Language barrier affecting health care      Diagnosis or treatment significantly limited by social determinants of health - language barrier, low health literacy, poverty      Return in about 3 months (around 11/17/2021) for Follow up, with me, in person, for DM.    Jasmyne Moctezuma MD  [facility] GERI [place] professional phone  used.   Marco Khan is a 56 year old female who presents today for the following   health issues: DM      Diabetes Mellitis Type 2  A1c increased from 7.0 on 6/17/19  to 7.6 on 10/3/19 to 8.4 on 1/30/20 -> 8.8 on 12/14/20 -> 8.3 on 3/15/21 -> 8.5 6/17/21  Got glucometer working with new battery  Last visit, changed metformin XR 750mg x 2 tabs (b/c not taking the 1000 BID and only daily) She is taking it at bedtimes- encouraged to take with meals   Cutting back on sweets and sugar juice and eating more vegetables  Home blood sugars - 120-140 in the morning and  at night  BP normal   Microalbuminuria - normal on on 10/3/19 and 12/14/21 on losartan -    [facility] exam on 8/303/19 at [facility], she has been waiting for pandemic settle down  CMP wnl 10/2019 and 12/14/20  LDL 98 on 10/3/19 - > 97 on 12/14/20    --feeling better, more energy, less headache    Colon cancer screening  Tubular adenoma of colon on colonscopy 7/2016 - repeat in 5 years  She cannot fit colonoscopy into her schedule, so agrees to FOBT for screening and will do colonoscopy next year when she has PTO.   - Occult Blood(ICT) was neg on 6/23/21     Needs COVID vaccine   She is not interested and scared and concerned it does not work b/c some people still get covid even if  "vaccinated  She thinks she had covid in the past but only lost her sense of smell (never got tested) but her  and kids tested positive for COVID   Declines     Review of Systems     Please see above.  The rest of the review of systems are negative for all systems.    Current Outpatient Medications   Medication Instructions     ACCU-CHEK GEORGES PLUS TEST STRP strips [ACCU-CHEK GEORGES PLUS TEST STRP STRIPS] TEST 2 TIMES A DAY     acetaminophen (MAPAP EXTRA STRENGTH) 500 MG tablet [ACETAMINOPHEN (MAPAP EXTRA STRENGTH) 500 MG TABLET] TAKE 1 TABLET (500 MG TOTAL)  BY MOUTH 2 (TWO) TIMES A DAY AS NEEDED FOR PAIN.     generic lancets (ACCU-CHEK SOFTCLIX LANCETS) [GENERIC LANCETS (ACCU-CHEK SOFTCLIX LANCETS)] USE TO TEST 2 TIMES A DAY     losartan (COZAAR) 100 mg, Oral, DAILY     metFORMIN (GLUCOPHAGE-XR) 1,500 mg, Oral, DAILY     ONETOUCH DELICA LANCETS 33 gauge Misc [ONETOUCH DELICA LANCETS 33 GAUGE MISC] Test twice daily         Objective   Vitals:    08/17/21 1037   BP: 104/68   Pulse: 61   Resp: 14   Temp: 97.6  F (36.4  C)   TempSrc: Oral   SpO2: 98%   Weight: 58.8 kg (129 lb 9.6 oz)   Height: 1.512 m (4' 11.53\")       Body mass index is 25.71 kg/m .    Physical Exam    OBJECTIVE:  Vitals listed above within normal limits  General appearance: well groomed, pleasant, well hydrated, nontoxic appearing  ENT: PERRL, throat clear  Neck: neck supple, no lymphadenopathy, no thyromegaly  Lungs: lungs clear to auscultation bilaterally, no wheezes or rhonchi  Heart: regular rate and rhythm, no murmurs, rubs or gallops  Abdomen: soft, nontender  Neuro: no focal deficits, CN II-XII grossly intact, alert and oriented  Psych:  mood stable, appears to have good insight and judgment    No results found for this or any previous visit (from the past 1008 hour(s)).           "

## 2021-10-21 DIAGNOSIS — E11.65 POORLY CONTROLLED TYPE 2 DIABETES MELLITUS (H): Primary | ICD-10-CM

## 2021-10-25 ENCOUNTER — OFFICE VISIT (OUTPATIENT)
Dept: FAMILY MEDICINE | Facility: CLINIC | Age: 56
End: 2021-10-25
Payer: COMMERCIAL

## 2021-10-25 VITALS
SYSTOLIC BLOOD PRESSURE: 104 MMHG | BODY MASS INDEX: 26.51 KG/M2 | HEIGHT: 59 IN | OXYGEN SATURATION: 98 % | WEIGHT: 131.5 LBS | HEART RATE: 57 BPM | TEMPERATURE: 98.1 F | DIASTOLIC BLOOD PRESSURE: 62 MMHG | RESPIRATION RATE: 17 BRPM

## 2021-10-25 DIAGNOSIS — Z59.6 POVERTY: ICD-10-CM

## 2021-10-25 DIAGNOSIS — Z60.3 LANGUAGE BARRIER: ICD-10-CM

## 2021-10-25 DIAGNOSIS — E11.65 POORLY CONTROLLED TYPE 2 DIABETES MELLITUS (H): ICD-10-CM

## 2021-10-25 DIAGNOSIS — Z75.8 LANGUAGE BARRIER: ICD-10-CM

## 2021-10-25 LAB
HBA1C MFR BLD: 9.6 % (ref 0–5.6)
HOLD SPECIMEN: NORMAL

## 2021-10-25 PROCEDURE — 83036 HEMOGLOBIN GLYCOSYLATED A1C: CPT | Performed by: FAMILY MEDICINE

## 2021-10-25 PROCEDURE — 99214 OFFICE O/P EST MOD 30 MIN: CPT | Performed by: FAMILY MEDICINE

## 2021-10-25 PROCEDURE — 36415 COLL VENOUS BLD VENIPUNCTURE: CPT | Performed by: FAMILY MEDICINE

## 2021-10-25 SDOH — SOCIAL STABILITY - SOCIAL INSECURITY: ACCULTURATION DIFFICULTY: Z60.3

## 2021-10-25 SDOH — ECONOMIC STABILITY - INCOME SECURITY: LOW INCOME: Z59.6

## 2021-10-25 ASSESSMENT — MIFFLIN-ST. JEOR: SCORE: 1092.11

## 2022-02-22 ENCOUNTER — OFFICE VISIT (OUTPATIENT)
Dept: FAMILY MEDICINE | Facility: CLINIC | Age: 57
End: 2022-02-22
Payer: COMMERCIAL

## 2022-02-22 VITALS
RESPIRATION RATE: 12 BRPM | TEMPERATURE: 99 F | WEIGHT: 131 LBS | BODY MASS INDEX: 26.41 KG/M2 | HEART RATE: 64 BPM | DIASTOLIC BLOOD PRESSURE: 70 MMHG | HEIGHT: 59 IN | SYSTOLIC BLOOD PRESSURE: 102 MMHG | OXYGEN SATURATION: 95 %

## 2022-02-22 DIAGNOSIS — E11.65 POORLY CONTROLLED TYPE 2 DIABETES MELLITUS (H): ICD-10-CM

## 2022-02-22 DIAGNOSIS — E78.1 HYPERTRIGLYCERIDEMIA: ICD-10-CM

## 2022-02-22 DIAGNOSIS — Z60.3 LANGUAGE BARRIER AFFECTING HEALTH CARE: ICD-10-CM

## 2022-02-22 DIAGNOSIS — Z75.8 LANGUAGE BARRIER AFFECTING HEALTH CARE: ICD-10-CM

## 2022-02-22 DIAGNOSIS — R80.9 MICROALBUMINURIA: ICD-10-CM

## 2022-02-22 DIAGNOSIS — I10 BENIGN ESSENTIAL HYPERTENSION: ICD-10-CM

## 2022-02-22 DIAGNOSIS — E55.9 VITAMIN D DEFICIENCY: ICD-10-CM

## 2022-02-22 DIAGNOSIS — E11.9 TYPE 2 DIABETES MELLITUS WITHOUT COMPLICATION, WITHOUT LONG-TERM CURRENT USE OF INSULIN (H): Primary | ICD-10-CM

## 2022-02-22 LAB
ALBUMIN SERPL-MCNC: 4 G/DL (ref 3.5–5)
ALP SERPL-CCNC: 53 U/L (ref 45–120)
ALT SERPL W P-5'-P-CCNC: 51 U/L (ref 0–45)
ANION GAP SERPL CALCULATED.3IONS-SCNC: 11 MMOL/L (ref 5–18)
AST SERPL W P-5'-P-CCNC: 45 U/L (ref 0–40)
BILIRUB SERPL-MCNC: 1.1 MG/DL (ref 0–1)
BUN SERPL-MCNC: 13 MG/DL (ref 8–22)
CALCIUM SERPL-MCNC: 9.2 MG/DL (ref 8.5–10.5)
CHLORIDE BLD-SCNC: 105 MMOL/L (ref 98–107)
CHOLEST SERPL-MCNC: 172 MG/DL
CO2 SERPL-SCNC: 23 MMOL/L (ref 22–31)
CREAT SERPL-MCNC: 0.73 MG/DL (ref 0.6–1.1)
CREAT UR-MCNC: 75 MG/DL
FASTING STATUS PATIENT QL REPORTED: ABNORMAL
GFR SERPL CREATININE-BSD FRML MDRD: >90 ML/MIN/1.73M2
GLUCOSE BLD-MCNC: 165 MG/DL (ref 70–125)
HBA1C MFR BLD: 8.8 % (ref 0–5.6)
HDLC SERPL-MCNC: 43 MG/DL
LDLC SERPL CALC-MCNC: 108 MG/DL
MICROALBUMIN UR-MCNC: 0.59 MG/DL (ref 0–1.99)
MICROALBUMIN/CREAT UR: 7.9 MG/G CR
POTASSIUM BLD-SCNC: 3.9 MMOL/L (ref 3.5–5)
PROT SERPL-MCNC: 7.1 G/DL (ref 6–8)
SODIUM SERPL-SCNC: 139 MMOL/L (ref 136–145)
TRIGL SERPL-MCNC: 103 MG/DL

## 2022-02-22 PROCEDURE — 80061 LIPID PANEL: CPT | Performed by: FAMILY MEDICINE

## 2022-02-22 PROCEDURE — 99213 OFFICE O/P EST LOW 20 MIN: CPT | Performed by: FAMILY MEDICINE

## 2022-02-22 PROCEDURE — 36415 COLL VENOUS BLD VENIPUNCTURE: CPT | Performed by: FAMILY MEDICINE

## 2022-02-22 PROCEDURE — 83036 HEMOGLOBIN GLYCOSYLATED A1C: CPT | Performed by: FAMILY MEDICINE

## 2022-02-22 PROCEDURE — 82043 UR ALBUMIN QUANTITATIVE: CPT | Performed by: FAMILY MEDICINE

## 2022-02-22 PROCEDURE — 80053 COMPREHEN METABOLIC PANEL: CPT | Performed by: FAMILY MEDICINE

## 2022-02-22 PROCEDURE — 82306 VITAMIN D 25 HYDROXY: CPT | Performed by: FAMILY MEDICINE

## 2022-02-22 RX ORDER — LOSARTAN POTASSIUM 100 MG/1
100 TABLET ORAL DAILY
Qty: 90 TABLET | Refills: 3 | Status: SHIPPED | OUTPATIENT
Start: 2022-02-22 | End: 2022-12-05

## 2022-02-22 SDOH — SOCIAL STABILITY - SOCIAL INSECURITY: ACCULTURATION DIFFICULTY: Z60.3

## 2022-02-22 NOTE — PROGRESS NOTES
"Assessment & Plan     Type 2 diabetes mellitus without complication, without long-term current use of insulin (H)  A1c improved but still not at goal of <8  Discussed adding medication vs diet changes. She wants to work on diet changes.   Recheck microalbumin, lipids, CMP  - Albumin Random Urine Quantitative with Creat Ratio  - Hemoglobin A1c  - Lipid panel  - Comprehensive metabolic panel (BMP + Alb, Alk Phos, ALT, AST, Total. Bili, TP)    Vitamin D deficiency  Recheck level   - Vitamin D Deficiency    Hypertriglyceridemia  Recheck, nonfasting  - Lipid panel    Language barrier affecting health care        Review of external notes as documented elsewhere in note  Ordering of each unique test  Prescription drug management    [id number]}     BMI:   Estimated body mass index is 26.44 kg/m  as calculated from the following:    Height as of this encounter: 1.499 m (4' 11.02\").    Weight as of this encounter: 59.4 kg (131 lb).         Return in about 6 months (around 8/22/2022) for Follow up, with me, in person, for DM, for med check.    Jasmyne Moctezuma MD  [facility] [place] professional phone  used.     Marco Khan is a 56 year old female who presents today for the following   health issues:    Diabetes Mellitis Type 2 (Goal <8 )   A1c increased from 7.0 on 6/17/19  to 7.6 on 10/3/19 to 8.4 on 1/30/20 -> 8.8 on 12/14/20 -> 8.3 on 3/15/21 -> 8.5 6/17/21 -> 9.6 on 10/25/21 -> 8.8 today   Last visit on 10/25/21 we changed her meds  - Stop metformin XR -> Start Janumet twice daily with meals  Glucometer - checking 1-2 x per week b/c pain  BP normal   Microalbuminuria - normal on on 10/3/19 and 12/14/20 on losartan -  (needs recheck)   Eye exam on 8/303/19 at [facility], she had been waiting for pandemic settle down. She reports eye clinic appt last month in Oct 2021 and TRUNG signed for [facility] eye clinic Nov 2021   CMP wnl 10/2019 and 12/14/20  LDL 98 on 10/3/19 - > 97 but  on " "12/14/20 (needs recheck) ate breakfast   DM ED referral ordered 7/15/21 but declined      Vit D deficiency   Last level 40.6 on 2019  No longer taking Vit D      Vaccines - due for covid - declines (handout in nayely language given)   Flu shot done at pharmacy in Oct 2021     Review of Systems     Please see above.  The rest of the review of systems are negative for all systems.    Current Outpatient Medications   Medication Instructions     ACCU-CHEK GEORGES PLUS TEST STRP strips [ACCU-CHEK GEORGES PLUS TEST STRP STRIPS] TEST 2 TIMES A DAY     acetaminophen (MAPAP EXTRA STRENGTH) 500 MG tablet [ACETAMINOPHEN (MAPAP EXTRA STRENGTH) 500 MG TABLET] TAKE 1 TABLET (500 MG TOTAL)  BY MOUTH 2 (TWO) TIMES A DAY AS NEEDED FOR PAIN.     blood glucose monitoring (NO BRAND SPECIFIED) meter device kit Use to test blood sugar 1 times daily or as directed.     generic lancets (ACCU-CHEK SOFTCLIX LANCETS) [GENERIC LANCETS (ACCU-CHEK SOFTCLIX LANCETS)] USE TO TEST 2 TIMES A DAY     losartan (COZAAR) 100 mg, Oral, DAILY     ONETOUCH DELICA LANCETS 33 gauge Misc [ONETOUCH DELICA LANCETS 33 GAUGE MISC] Test twice daily     sitagliptin-metFORMIN (JANUMET)  MG tablet 1 tablet, Oral, 2 TIMES DAILY WITH MEALS         Objective   Vitals:    02/22/22 0927   BP: 102/70   Pulse: 64   Resp: 12   Temp: 99  F (37.2  C)   TempSrc: Oral   SpO2: 95%   Weight: 59.4 kg (131 lb)   Height: 1.499 m (4' 11.02\")       Body mass index is 26.44 kg/m .    Physical Exam    OBJECTIVE:  Vitals listed above within normal limits  General appearance: well groomed, pleasant, well hydrated, nontoxic appearing  ENT: PERRL, throat clear  Neck: neck supple, no lymphadenopathy, no thyromegaly  Lungs: lungs clear to auscultation bilaterally, no wheezes or rhonchi  Heart: regular rate and rhythm, no murmurs, rubs or gallops  Abdomen: soft, nontender  Neuro: no focal deficits, CN II-XII grossly intact, alert and oriented  Psych:  mood stable, appears to have good insight " and judgment    Recent Results (from the past 1008 hour(s))   Hemoglobin A1c    Collection Time: 02/22/22  9:34 AM   Result Value Ref Range    Hemoglobin A1C 8.8 (H) 0.0 - 5.6 %

## 2022-02-23 LAB — DEPRECATED CALCIDIOL+CALCIFEROL SERPL-MC: 25 UG/L (ref 30–80)

## 2022-03-01 ENCOUNTER — TELEPHONE (OUTPATIENT)
Dept: FAMILY MEDICINE | Facility: CLINIC | Age: 57
End: 2022-03-01
Payer: COMMERCIAL

## 2022-03-01 DIAGNOSIS — E55.9 VITAMIN D DEFICIENCY: Primary | ICD-10-CM

## 2022-03-01 RX ORDER — ERGOCALCIFEROL 1.25 MG/1
50000 CAPSULE, LIQUID FILLED ORAL WEEKLY
Qty: 4 CAPSULE | Refills: 11 | Status: SHIPPED | OUTPATIENT
Start: 2022-03-01 | End: 2023-03-20

## 2022-03-01 NOTE — TELEPHONE ENCOUNTER
Called pt and left VM to call clinic.  1st attempt.  Ok to relay when pt calls back. thanks            ----- Message from Jasmyne Moctezuma MD sent at 3/1/2022  3:54 PM CST -----  Please let patient know that her Vitamin D is low. Rx for high dose Vitamin D (ergocalciferol 50,000 units) to take one tab weekly was sent to her pharmacy.   Dr. Jasmyne Moctezuma

## 2022-03-01 NOTE — RESULT ENCOUNTER NOTE
Please let patient know that her Vitamin D is low. Rx for high dose Vitamin D (ergocalciferol 50,000 units) to take one tab weekly was sent to her pharmacy.   Dr. Jasmyne Moctezuma

## 2022-03-01 NOTE — LETTER
March 4, 2022      Marco Khan  [address] [place] [address]        Dear ,    We are writing to inform you of your test results.    Your Vitamin D level is low at 25, we want this to be at least 30. Please  the high-dose Vitamin D supplement sent to [facility] [place], [place] - [address] [place] [address], and take once weekly (DO NOT take everyday).     Resulted Orders   Hemoglobin A1c   Result Value Ref Range    Hemoglobin A1C 8.8 (H) 0.0 - 5.6 %      Comment:      Normal <5.7%   Prediabetes 5.7-6.4%    Diabetes 6.5% or higher     Note: Adopted from ADA consensus guidelines.   Lipid panel   Result Value Ref Range    Cholesterol 172 <=199 mg/dL    Triglycerides 103 <=149 mg/dL    Direct Measure HDL 43 (L) >=50 mg/dL      Comment:      HDL Cholesterol Reference Range:     0-2 years:   No reference ranges established for patients under 2 years old  at [facility] for lipid analytes.    2-8 years:  Greater than 45 mg/dL     18 years and older:   Female: Greater than or equal to 50 mg/dL   Male:   Greater than or equal to 40 mg/dL    LDL Cholesterol Calculated 108 <=129 mg/dL    Patient Fasting > 8hrs? Unknown    Comprehensive metabolic panel (BMP + Alb, Alk Phos, ALT, AST, Total. Bili, TP)   Result Value Ref Range    Sodium 139 136 - 145 mmol/L    Potassium 3.9 3.5 - 5.0 mmol/L    Chloride 105 98 - 107 mmol/L    Carbon Dioxide (CO2) 23 22 - 31 mmol/L    Anion Gap 11 5 - 18 mmol/L    Urea Nitrogen 13 8 - 22 mg/dL    Creatinine 0.73 0.60 - 1.10 mg/dL    Calcium 9.2 8.5 - 10.5 mg/dL    Glucose 165 (H) 70 - 125 mg/dL    Alkaline Phosphatase 53 45 - 120 U/L    AST 45 (H) 0 - 40 U/L    ALT 51 (H) 0 - 45 U/L    Protein Total 7.1 6.0 - 8.0 g/dL    Albumin 4.0 3.5 - 5.0 g/dL    Bilirubin Total 1.1 (H) 0.0 - 1.0 mg/dL    GFR Estimate >90 >60 mL/min/1.73m2      Comment:      Effective December 21, 2021 eGFRcr in adults is calculated using the 2021 CKD-EPI creatinine equation which includes age and gender  (Mandy caraballo al., [facility], DOI: 10.1056/VHSZvs6350154)   Vitamin D Deficiency   Result Value Ref Range    Vitamin D, Total (25-Hydroxy) 25 (L) 30 - 80 ug/L    Narrative    Deficiency <10.0 ug/L  Insufficiency 10.0-29.9 ug/L  Sufficiency 30.0-80.0 ug/L  Toxicity (possible) >100.0 ug/L    Albumin Random Urine Quantitative with Creat Ratio   Result Value Ref Range    Microalbumin Urine mg/dL 0.59 0.00 - 1.99 mg/dL    Creatinine Urine mg/dL 75 mg/dL    Microalbumin Urine mg/g Cr 7.9 <=19.9 mg/g Cr    Narrative    Microalbumin, Random Urine   <2.0 mg/dL . . . . . . . . Normal   3.0-30.0 mg/dL . . . . . . Microalbuminuria   >30.0 mg/dL . . . . . .  . Clinical Proteinuria     Microalbumin/Creatinine Ratio, Random Urine   <20 mg/g . . . . .. . . . Normal    mg/g . . . . . . . Microalbuminuria   >300 mg/g . . . . . . . . Clinical Proteinuria       If you have any questions or concerns, please call the clinic at the number listed above.       Sincerely,        Dr. Jasmyne Moctezuma MD        SLS[facility]martin

## 2022-04-04 ENCOUNTER — TELEPHONE (OUTPATIENT)
Dept: FAMILY MEDICINE | Facility: CLINIC | Age: 57
End: 2022-04-04
Payer: COMMERCIAL

## 2022-04-04 NOTE — TELEPHONE ENCOUNTER
General Call:     Who is calling:  Spouse     Reason for Call:  Pt did not receive the Vit D ordered a month ago by PCP    What are your questions or concerns:  CMT called pharmacy who said it was not picked up.  They will get it ready for pt.      Okay to leave a detailed message:Yes at Home number on file [phone] (home)     CMT did speak to pt and relayed above.  Pt understands.  thanks

## 2022-06-01 ENCOUNTER — TRANSFERRED RECORDS (OUTPATIENT)
Dept: HEALTH INFORMATION MANAGEMENT | Facility: CLINIC | Age: 57
End: 2022-06-01

## 2022-06-01 LAB — RETINOPATHY: NORMAL

## 2022-07-23 DIAGNOSIS — E11.65 POORLY CONTROLLED TYPE 2 DIABETES MELLITUS (H): Primary | ICD-10-CM

## 2022-07-23 DIAGNOSIS — E11.9 CONTROLLED TYPE 2 DIABETES MELLITUS WITHOUT COMPLICATION, WITHOUT LONG-TERM CURRENT USE OF INSULIN (H): ICD-10-CM

## 2022-07-24 RX ORDER — BLOOD SUGAR DIAGNOSTIC
STRIP MISCELLANEOUS
Qty: 50 STRIP | Refills: 8 | Status: SHIPPED | OUTPATIENT
Start: 2022-07-24

## 2022-07-24 NOTE — TELEPHONE ENCOUNTER
"Last Written Prescription Date:  11/25/2020  Last Fill Quantity: 50,  # refills: 15   Last office visit provider:  2.22.22     Requested Prescriptions   Pending Prescriptions Disp Refills     ACCU-CHEK GEORGES PLUS test strip [Pharmacy Med Name: ACCU-CHEK GEORGES PLUS STRP Strip] 50 strip 15     Sig: TEST 2 TIMES A DAY       Diabetic Supplies Protocol Passed - 7/23/2022 12:11 PM        Passed - Medication is active on med list        Passed - Patient is 18 years of age or older        Passed - Recent (6 mo) or future (30 days) visit within the authorizing provider's specialty     Patient had office visit in the last 6 months or has a visit in the next 30 days with authorizing provider.  See \"Patient Info\" tab in inbasket, or \"Choose Columns\" in Meds & Orders section of the refill encounter.                 Shy Yanes RN 07/24/22 1:34 PM  "

## 2022-07-24 NOTE — TELEPHONE ENCOUNTER
"Routing refill request to provider for review/approval because:  A break in medication    Last Written Prescription Date:  10/6/2020  Last Fill Quantity: 100,  # refills: 7   Last office visit provider:  2/22/2022     Requested Prescriptions   Pending Prescriptions Disp Refills     blood glucose monitoring (SOFTCLIX) lancets [Pharmacy Med Name: ACCU-CHEK SOFTCLIX LANCETS Miscellaneous] 100 each 7     Sig: USE TO TEST 2 TIMES A DAY       Diabetic Supplies Protocol Passed - 7/24/2022  1:13 PM        Passed - Medication is active on med list        Passed - Patient is 18 years of age or older        Passed - Recent (6 mo) or future (30 days) visit within the authorizing provider's specialty     Patient had office visit in the last 6 months or has a visit in the next 30 days with authorizing provider.  See \"Patient Info\" tab in inbasket, or \"Choose Columns\" in Meds & Orders section of the refill encounter.                 Valorie Bolden RN 07/24/22 1:14 PM  "

## 2022-07-25 RX ORDER — LANCETS
EACH MISCELLANEOUS
Qty: 100 EACH | Refills: 7 | Status: SHIPPED | OUTPATIENT
Start: 2022-07-25

## 2022-08-22 ENCOUNTER — OFFICE VISIT (OUTPATIENT)
Dept: FAMILY MEDICINE | Facility: CLINIC | Age: 57
End: 2022-08-22
Payer: COMMERCIAL

## 2022-08-22 VITALS
TEMPERATURE: 98 F | BODY MASS INDEX: 24.95 KG/M2 | DIASTOLIC BLOOD PRESSURE: 70 MMHG | HEIGHT: 59 IN | WEIGHT: 123.75 LBS | RESPIRATION RATE: 16 BRPM | SYSTOLIC BLOOD PRESSURE: 106 MMHG | HEART RATE: 60 BPM | OXYGEN SATURATION: 98 %

## 2022-08-22 DIAGNOSIS — E11.9 TYPE 2 DIABETES MELLITUS WITHOUT COMPLICATION, WITHOUT LONG-TERM CURRENT USE OF INSULIN (H): Primary | ICD-10-CM

## 2022-08-22 DIAGNOSIS — Z76.0 ENCOUNTER FOR MEDICATION REFILL: ICD-10-CM

## 2022-08-22 DIAGNOSIS — R74.01 TRANSAMINITIS: ICD-10-CM

## 2022-08-22 LAB
ALBUMIN SERPL BCG-MCNC: 4.4 G/DL (ref 3.5–5.2)
ALP SERPL-CCNC: 51 U/L (ref 35–104)
ALT SERPL W P-5'-P-CCNC: 36 U/L (ref 10–35)
AST SERPL W P-5'-P-CCNC: 32 U/L (ref 10–35)
BILIRUB DIRECT SERPL-MCNC: <0.2 MG/DL (ref 0–0.3)
BILIRUB SERPL-MCNC: 0.6 MG/DL
HBA1C MFR BLD: 8.5 % (ref 0–5.6)
PROT SERPL-MCNC: 7.2 G/DL (ref 6.4–8.3)

## 2022-08-22 PROCEDURE — 36415 COLL VENOUS BLD VENIPUNCTURE: CPT | Performed by: FAMILY MEDICINE

## 2022-08-22 PROCEDURE — 80076 HEPATIC FUNCTION PANEL: CPT | Performed by: FAMILY MEDICINE

## 2022-08-22 PROCEDURE — 83036 HEMOGLOBIN GLYCOSYLATED A1C: CPT | Performed by: FAMILY MEDICINE

## 2022-08-22 PROCEDURE — 99214 OFFICE O/P EST MOD 30 MIN: CPT | Performed by: FAMILY MEDICINE

## 2022-08-22 RX ORDER — ACETAMINOPHEN 500 MG
TABLET ORAL
Qty: 90 TABLET | Refills: 5 | Status: SHIPPED | OUTPATIENT
Start: 2022-08-22 | End: 2023-10-02

## 2022-08-22 NOTE — PROGRESS NOTES
Pa was seen today for diabetes     Diagnoses and all orders for this visit:    Type 2 diabetes mellitus without complication, without long-term current use of insulin (H): On janumet- still with elevated A1C. Reviewed risks of hyperglycemia with patient. Reviewed options for treatment, including adding medication. She is hesitant to start another medication and would like to try dietary/exercise modifications- reviewed brown rice and limiting carbohydrate portions. Encouraged activity post-meals. Recheck A1C in 3 months- if still elevated, would encourage to add medication. TRUNG for eye exam completed at [facility] in the last year.   -     Hemoglobin A1c; Future  -     Hemoglobin A1c    Transaminitis: Mild elevation- recheck. Denies RUQ pain or other symptoms.   -     Hepatic panel (Albumin, ALT, AST, Bili, Alk Phos, TP); Future  -     Hepatic panel (Albumin, ALT, AST, Bili, Alk Phos, TP)    Encounter for medication refill  -     acetaminophen (TYLENOL) 500 MG tablet; [ACETAMINOPHEN (MAPAP EXTRA STRENGTH) 500 MG TABLET] TAKE 1 TABLET (500 MG TOTAL)  BY MOUTH 2 (TWO) TIMES A DAY AS NEEDED FOR PAIN.        Subjective   Pa is a 57 year old, presenting for the following health issues:  Diabetes       History of Present Illness       Diabetes:   She presents for follow up of diabetes.  She is not checking blood glucose. She has no concerns regarding her diabetes at this time.  She is having weight loss. The patient has had a diabetic eye exam in the last 12 months. Eye exam performed on some time in may marcelino or july (unsure which month). Location of last eye exam [facility].           She works at [employer]  Changed from metformin to janumet recently by PCP  PCP recently left clinic  She is here for diabetes follow-up  She is feeling good on her medication and does not want to add medication at this time  She would like to work on dietary changes- she does not have any questions about nutrition and plans to  "add more brown rice and limit her carbohydrate portions.    Review of Systems         Objective    /70   Pulse 60   Temp 98  F (36.7  C) (Oral)   Resp 16   Ht 1.499 m (4' 11.02\")   Wt 56.1 kg (123 lb 12 oz)   LMP  (LMP Unknown)   SpO2 98%   BMI 24.98 kg/m    Body mass index is 24.98 kg/m .  Physical Exam   Gen: well appearing  Diabetic foot exam: normal DP and PT pulses, no trophic changes or ulcerative lesions, normal sensory exam and normal monofilament exam              .  ..  "

## 2022-12-05 ENCOUNTER — OFFICE VISIT (OUTPATIENT)
Dept: FAMILY MEDICINE | Facility: CLINIC | Age: 57
End: 2022-12-05
Payer: COMMERCIAL

## 2022-12-05 ENCOUNTER — ANCILLARY PROCEDURE (OUTPATIENT)
Dept: MAMMOGRAPHY | Facility: CLINIC | Age: 57
End: 2022-12-05
Attending: FAMILY MEDICINE
Payer: COMMERCIAL

## 2022-12-05 VITALS
RESPIRATION RATE: 14 BRPM | OXYGEN SATURATION: 97 % | DIASTOLIC BLOOD PRESSURE: 70 MMHG | SYSTOLIC BLOOD PRESSURE: 120 MMHG | BODY MASS INDEX: 25.13 KG/M2 | TEMPERATURE: 97.2 F | WEIGHT: 128 LBS | HEART RATE: 63 BPM | HEIGHT: 60 IN

## 2022-12-05 DIAGNOSIS — E11.65 POORLY CONTROLLED TYPE 2 DIABETES MELLITUS (H): Primary | ICD-10-CM

## 2022-12-05 DIAGNOSIS — Z12.31 VISIT FOR SCREENING MAMMOGRAM: ICD-10-CM

## 2022-12-05 DIAGNOSIS — Z12.31 ENCOUNTER FOR SCREENING MAMMOGRAM FOR MALIGNANT NEOPLASM OF BREAST: ICD-10-CM

## 2022-12-05 LAB — HBA1C MFR BLD: 8.7 % (ref 0–5.6)

## 2022-12-05 PROCEDURE — 83036 HEMOGLOBIN GLYCOSYLATED A1C: CPT | Performed by: FAMILY MEDICINE

## 2022-12-05 PROCEDURE — 99214 OFFICE O/P EST MOD 30 MIN: CPT | Performed by: FAMILY MEDICINE

## 2022-12-05 PROCEDURE — 77067 SCR MAMMO BI INCL CAD: CPT | Mod: TC | Performed by: RADIOLOGY

## 2022-12-05 PROCEDURE — 36415 COLL VENOUS BLD VENIPUNCTURE: CPT | Performed by: FAMILY MEDICINE

## 2022-12-05 RX ORDER — LOSARTAN POTASSIUM 100 MG/1
100 TABLET ORAL DAILY
Qty: 90 TABLET | Refills: 3 | Status: SHIPPED | OUTPATIENT
Start: 2022-02-01 | End: 2023-10-02

## 2022-12-05 NOTE — LETTER
December 5, 2022      Marco Khan  [address] [place] [address]        To Whom It May Concern:    Marco Khan  was seen on 12/5/22 for a check up at our clinic. This is to notify you that she will be late to arrive to work today.        Sincerely,        Taylor De Guzman MD

## 2022-12-05 NOTE — PROGRESS NOTES
Diagnoses and all orders for this visit:    Poorly controlled type 2 diabetes mellitus (H): A1C still not at goal, 8.7 today. Reviewed options. Agreed to add oral agent to her janumet. Added SGLT to help with glycemic control. Repeat A1C in 3 months, may need to increase dose of empagliflozin depending on A1C. She would also benefit from a statin, however, to avoid too many medication changes at once- will discuss this at her next visit.  -     HEMOGLOBIN A1C; Future  -     HEMOGLOBIN A1C  -     empagliflozin (JARDIANCE) 10 MG TABS tablet; Take 1 tablet (10 mg) by mouth daily    Encounter for screening mammogram for malignant neoplasm of breast  -     MA SCREENING DIGITAL BILAT - Future  (s+30); Future    Other orders: Refilled- will be due for refill. Could consider switching to ACE-inhibitor in the future due to diabetes and renal protection. I want to avoid too many medication changes at once, so will wait to discuss this until another visit, as she has been stable on losartan.  -     losartan (COZAAR) 100 MG tablet; Take 1 tablet (100 mg) by mouth daily          Subjective   Pa is a 57 year old, presenting for the following health issues:    History of Present Illness       Diabetes:   She presents for follow up of diabetes.  She is checking home blood glucose a few times a week. She checks blood glucose before meals.  Blood glucose is never over 200 and never under 70. When her blood glucose is low, the patient is asymptomatic for confusion, blurred vision, lethargy and reports not feeling dizzy, shaky, or weak.  She has no concerns regarding her diabetes at this time.  She is not experiencing numbness or burning in feet, excessive thirst, blurry vision, weight changes or redness, sores or blisters on feet.        Patient takes janumet twice daily for diabetes  She is on ARB, not ACE-inhibitor  She is not on a statin  She was a transfer of care from Dr. Moctezuma  Has been working on dietary modifications  She  "works at [employer]    Has never had Covid-19 vaccine. She became very sick with her flu shot and is not interested in Covid-19 vaccine           Review of Systems         Objective    /70   Pulse 63   Temp 97.2  F (36.2  C) (Oral)   Resp 14   Ht 1.513 m (4' 11.57\")   Wt 58.1 kg (128 lb)   LMP  (LMP Unknown)   SpO2 97%   BMI 25.36 kg/m    Body mass index is 25.36 kg/m .  Physical Exam   Gen: well appearing                    "

## 2023-01-13 ENCOUNTER — TRANSFERRED RECORDS (OUTPATIENT)
Dept: HEALTH INFORMATION MANAGEMENT | Facility: CLINIC | Age: 58
End: 2023-01-13
Payer: COMMERCIAL

## 2023-01-13 LAB — RETINOPATHY: NEGATIVE

## 2023-03-18 DIAGNOSIS — E11.9 TYPE 2 DIABETES MELLITUS WITHOUT COMPLICATION, WITHOUT LONG-TERM CURRENT USE OF INSULIN (H): Primary | ICD-10-CM

## 2023-03-20 ENCOUNTER — OFFICE VISIT (OUTPATIENT)
Dept: FAMILY MEDICINE | Facility: CLINIC | Age: 58
End: 2023-03-20
Payer: COMMERCIAL

## 2023-03-20 VITALS
WEIGHT: 129.25 LBS | BODY MASS INDEX: 25.38 KG/M2 | SYSTOLIC BLOOD PRESSURE: 100 MMHG | DIASTOLIC BLOOD PRESSURE: 70 MMHG | HEIGHT: 60 IN | TEMPERATURE: 97.7 F | OXYGEN SATURATION: 98 % | RESPIRATION RATE: 16 BRPM | HEART RATE: 59 BPM

## 2023-03-20 DIAGNOSIS — E11.9 TYPE 2 DIABETES MELLITUS WITHOUT COMPLICATION, WITHOUT LONG-TERM CURRENT USE OF INSULIN (H): Primary | ICD-10-CM

## 2023-03-20 DIAGNOSIS — Z12.4 CERVICAL CANCER SCREENING: ICD-10-CM

## 2023-03-20 PROCEDURE — 99213 OFFICE O/P EST LOW 20 MIN: CPT | Performed by: FAMILY MEDICINE

## 2023-03-20 RX ORDER — ATORVASTATIN CALCIUM 20 MG/1
20 TABLET, FILM COATED ORAL DAILY
Qty: 90 TABLET | Refills: 3 | Status: SHIPPED | OUTPATIENT
Start: 2023-03-20 | End: 2023-10-02

## 2023-03-20 NOTE — PROGRESS NOTES
"  Pa was seen today for diabetes.    Diagnoses and all orders for this visit:    Type 2 diabetes mellitus without complication, without long-term current use of insulin (H): Awaiting A1C from today- will adjust medications if needed. Continue janumet, empagliflozin. If A1C elevated, could easily increase empagliflozin dose. I did add statin medication today- explained the reasoning behind this to reduce cardiovascular mortality.   -     atorvastatin (LIPITOR) 20 MG tablet; Take 1 tablet (20 mg) by mouth daily  -     sitagliptin-metFORMIN (JANUMET)  MG tablet; Take 1 tablet by mouth 2 times daily (with meals)    Cervical cancer screening: Due August 2023- declines today.        Subjective   Pa is a 57 year old, presenting for the following health issues:  Diabetes      History of Present Illness       Diabetes:   She presents for follow up of diabetes.  She is checking home blood glucose a few times a week. She checks blood glucose before and after meals.  Blood glucose is sometimes over 200 and sometimes under 70. She is aware of hypoglycemia symptoms including weakness and lethargy. She has no concerns regarding her diabetes at this time.  She is not experiencing numbness or burning in feet, excessive thirst, blurry vision, weight changes or redness, sores or blisters on feet.         She eats 2-3 servings of fruits and vegetables daily.She consumes 0 sweetened beverage(s) daily.She exercises with enough effort to increase her heart rate 9 or less minutes per day.  She exercises with enough effort to increase her heart rate 3 or less days per week.   She is taking medications regularly.        fastings  Post-meals 200-250- 2 hours after eating    She often eats brown rice- cooks her own rice different from her families  Doing well with medications      Review of Systems         Objective    /70   Pulse 59   Temp 97.7  F (36.5  C) (Oral)   Resp 16   Ht 1.513 m (4' 11.57\")   Wt 58.6 kg (129 lb " 4 oz)   LMP  (LMP Unknown)   SpO2 98%   BMI 25.61 kg/m    Body mass index is 25.61 kg/m .  Physical Exam   Gen: well appearing, no distress

## 2023-04-05 NOTE — TELEPHONE ENCOUNTER
Refill Approved    Rx renewed per Medication Renewal Policy. Medication was last renewed on 1/11/2018.    Julieta Cartwright, [facility] Triage/Med Refill 1/17/2019     Requested Prescriptions   Pending Prescriptions Disp Refills     amitriptyline (ELAVIL) 10 MG tablet [Pharmacy Med Name: AMITRIPTYLINE HCL 10 MG TAB] 30 tablet 0     Sig: TAKE ONE TABLET BY MOUTH DAILY AT BEDTIME.    Tricyclics/Misc Antidepressant/Antianxiety Meds Refill Protocol Passed - 1/16/2019  2:21 PM       Passed - PCP or prescribing provider visit in last year    Last office visit with prescriber/PCP: 11/6/2018 Jasmyne Moctezuma MD OR same dept: 11/6/2018 Jasmyne Moctezuma MD OR same specialty: 11/6/2018 Jasmyne Moctezuma MD  Last physical: 8/2/2018 Last MTM visit: Visit date not found   Next visit within 3 mo: Visit date not found  Next physical within 3 mo: Visit date not found  Prescriber OR PCP: Jasmyne Moctezuma MD  Last diagnosis associated with med order: 1. Headache  - amitriptyline (ELAVIL) 10 MG tablet [Pharmacy Med Name: AMITRIPTYLINE HCL 10 MG TAB]; TAKE ONE TABLET BY MOUTH DAILY AT BEDTIME.  Dispense: 30 tablet; Refill: 0    If protocol passes may refill for 12 months if within 3 months of last provider visit (or a total of 15 months).                          Render Risk Assessment In Note?: no Recommendation Preamble: The following recommendations were made during the visit: Recommendations (Free Text): SEEN and Ranjeet shampoo Detail Level: Zone

## 2023-06-26 ENCOUNTER — OFFICE VISIT (OUTPATIENT)
Dept: FAMILY MEDICINE | Facility: CLINIC | Age: 58
End: 2023-06-26
Payer: COMMERCIAL

## 2023-06-26 VITALS
SYSTOLIC BLOOD PRESSURE: 100 MMHG | HEART RATE: 59 BPM | RESPIRATION RATE: 16 BRPM | HEIGHT: 59 IN | DIASTOLIC BLOOD PRESSURE: 70 MMHG | OXYGEN SATURATION: 98 % | WEIGHT: 126.04 LBS | TEMPERATURE: 97.7 F | BODY MASS INDEX: 25.41 KG/M2

## 2023-06-26 DIAGNOSIS — E11.9 TYPE 2 DIABETES MELLITUS WITHOUT COMPLICATION, WITHOUT LONG-TERM CURRENT USE OF INSULIN (H): Primary | ICD-10-CM

## 2023-06-26 LAB
ANION GAP SERPL CALCULATED.3IONS-SCNC: 12 MMOL/L (ref 7–15)
BUN SERPL-MCNC: 17.6 MG/DL (ref 6–20)
CALCIUM SERPL-MCNC: 9.5 MG/DL (ref 8.6–10)
CHLORIDE SERPL-SCNC: 98 MMOL/L (ref 98–107)
CHOLEST SERPL-MCNC: 171 MG/DL
CREAT SERPL-MCNC: 0.72 MG/DL (ref 0.51–0.95)
DEPRECATED HCO3 PLAS-SCNC: 25 MMOL/L (ref 22–29)
GFR SERPL CREATININE-BSD FRML MDRD: >90 ML/MIN/1.73M2
GLUCOSE SERPL-MCNC: 159 MG/DL (ref 70–99)
HBA1C MFR BLD: 9.2 % (ref 0–5.6)
HDLC SERPL-MCNC: 39 MG/DL
LDLC SERPL CALC-MCNC: 97 MG/DL
NONHDLC SERPL-MCNC: 132 MG/DL
POTASSIUM SERPL-SCNC: 4.6 MMOL/L (ref 3.4–5.3)
SODIUM SERPL-SCNC: 135 MMOL/L (ref 136–145)
TRIGL SERPL-MCNC: 176 MG/DL

## 2023-06-26 PROCEDURE — 80048 BASIC METABOLIC PNL TOTAL CA: CPT | Performed by: FAMILY MEDICINE

## 2023-06-26 PROCEDURE — 80061 LIPID PANEL: CPT | Performed by: FAMILY MEDICINE

## 2023-06-26 PROCEDURE — 36415 COLL VENOUS BLD VENIPUNCTURE: CPT | Performed by: FAMILY MEDICINE

## 2023-06-26 PROCEDURE — 99214 OFFICE O/P EST MOD 30 MIN: CPT | Performed by: FAMILY MEDICINE

## 2023-06-26 PROCEDURE — 83036 HEMOGLOBIN GLYCOSYLATED A1C: CPT | Performed by: FAMILY MEDICINE

## 2023-06-26 NOTE — PROGRESS NOTES
Pa was seen today for diabetes.    Diagnoses and all orders for this visit:    Type 2 diabetes mellitus without complication, without long-term current use of insulin (H): A1C not at goal. Reviewed option for diabetes education referral but patient declined. She is amenable to adjusting her medications- will increase empagliflozin from 10 to 25 mg daily. Continue janumet. Reviewed dietary and exercise modifications. Offered CGM since patient was hesitant to begin testing her sugars twice daily, but patient declined and prefers to use her current glucometer and testing strips. She will start checking twice daily- reviewed goal blood sugar #'s.   -     Basic metabolic panel  (Ca, Cl, CO2, Creat, Gluc, K, Na, BUN)  -     Hemoglobin A1c  -     Lipid panel reflex to direct LDL Non-fasting  -     empagliflozin (JARDIANCE) 25 MG TABS tablet; Take 1 tablet (25 mg) by mouth daily      Due for pap smear at next visit        Subjective   Pa is a 58 year old, presenting for the following health issues:  Diabetes        6/26/2023     9:20 AM   Additional Questions   Roomed by Ann Jorge MA   Accompanied by SELF     History of Present Illness       Diabetes:   She presents for follow up of diabetes.  She is checking home blood glucose a few times a month. She checks blood glucose before meals.  Blood glucose is never over 200 and never under 70. When her blood glucose is low, the patient is asymptomatic for confusion, blurred vision, lethargy and reports not feeling dizzy, shaky, or weak.  She has no concerns regarding her diabetes at this time.  She is having weight loss.          Fasting .   Post-prandial: 150-170 (2 hours)  Does not check regularly- maybe a few times per month  She works at [employer]- does not have time for additional appointments- she is not interested in diabetes education appointment  She takes medications regularly- does not miss any doses  She does eat brown rice      Review of Systems        "  Objective    /70   Pulse 59   Temp 97.7  F (36.5  C) (Temporal)   Resp 16   Ht 1.499 m (4' 11\")   Wt 57.2 kg (126 lb 0.6 oz)   LMP  (LMP Unknown)   SpO2 98%   BMI 25.46 kg/m    Body mass index is 25.46 kg/m .   Wt Readings from Last 3 Encounters:   06/26/23 57.2 kg (126 lb 0.6 oz)   03/20/23 58.6 kg (129 lb 4 oz)   12/05/22 58.1 kg (128 lb)       Physical Exam   Gen: well appearing, no distress                "

## 2023-08-31 ENCOUNTER — TRANSFERRED RECORDS (OUTPATIENT)
Dept: MULTI SPECIALTY CLINIC | Facility: CLINIC | Age: 58
End: 2023-08-31

## 2023-08-31 LAB — RETINOPATHY: NORMAL

## 2023-10-01 DIAGNOSIS — E11.9 TYPE 2 DIABETES MELLITUS WITHOUT COMPLICATION, WITHOUT LONG-TERM CURRENT USE OF INSULIN (H): Primary | ICD-10-CM

## 2023-10-02 ENCOUNTER — OFFICE VISIT (OUTPATIENT)
Dept: FAMILY MEDICINE | Facility: CLINIC | Age: 58
End: 2023-10-02
Payer: COMMERCIAL

## 2023-10-02 VITALS
BODY MASS INDEX: 25.5 KG/M2 | HEIGHT: 59 IN | HEART RATE: 57 BPM | DIASTOLIC BLOOD PRESSURE: 58 MMHG | OXYGEN SATURATION: 98 % | SYSTOLIC BLOOD PRESSURE: 86 MMHG | RESPIRATION RATE: 16 BRPM | WEIGHT: 126.5 LBS | TEMPERATURE: 97.6 F

## 2023-10-02 DIAGNOSIS — Z76.0 ENCOUNTER FOR MEDICATION REFILL: ICD-10-CM

## 2023-10-02 DIAGNOSIS — E11.9 TYPE 2 DIABETES MELLITUS WITHOUT COMPLICATION, WITHOUT LONG-TERM CURRENT USE OF INSULIN (H): ICD-10-CM

## 2023-10-02 DIAGNOSIS — Z00.00 ROUTINE GENERAL MEDICAL EXAMINATION AT A HEALTH CARE FACILITY: Primary | ICD-10-CM

## 2023-10-02 LAB — HBA1C MFR BLD: 7.6 % (ref 0–5.6)

## 2023-10-02 PROCEDURE — 99396 PREV VISIT EST AGE 40-64: CPT | Mod: 25 | Performed by: FAMILY MEDICINE

## 2023-10-02 PROCEDURE — 90471 IMMUNIZATION ADMIN: CPT | Performed by: FAMILY MEDICINE

## 2023-10-02 PROCEDURE — 36415 COLL VENOUS BLD VENIPUNCTURE: CPT | Performed by: FAMILY MEDICINE

## 2023-10-02 PROCEDURE — 83036 HEMOGLOBIN GLYCOSYLATED A1C: CPT | Performed by: FAMILY MEDICINE

## 2023-10-02 PROCEDURE — 99214 OFFICE O/P EST MOD 30 MIN: CPT | Mod: 25 | Performed by: FAMILY MEDICINE

## 2023-10-02 PROCEDURE — 90686 IIV4 VACC NO PRSV 0.5 ML IM: CPT | Performed by: FAMILY MEDICINE

## 2023-10-02 RX ORDER — LOSARTAN POTASSIUM 50 MG/1
50 TABLET ORAL DAILY
Qty: 90 TABLET | Refills: 3 | Status: SHIPPED | OUTPATIENT
Start: 2023-10-02 | End: 2024-09-11

## 2023-10-02 RX ORDER — ACETAMINOPHEN 500 MG
TABLET ORAL
Qty: 90 TABLET | Refills: 5 | Status: SHIPPED | OUTPATIENT
Start: 2023-10-02

## 2023-10-02 NOTE — PROGRESS NOTES
Pa was seen today for diabetes and gyn exam.    Diagnoses and all orders for this visit:    Routine general medical examination at a health care facility: Declines pap smear.    Type 2 diabetes mellitus without complication, without long-term current use of insulin (H): A1C not at goal but significantly improved. Due to having a few isolated low BS at work, will not adjust medication, but focused on encouraging patient to eat regularly and eat appropriate carbohydrate portions. Of note, she is on ARB- per chart review, this was started by former PCP due to remote history of microalbuminuria- her last check was normal and she has been borderline hypotensive, so will decrease dose from 100 mg to 50 mg and monitor her BP at next appointment.   -     Hemoglobin A1c  -     losartan (COZAAR) 50 MG tablet; Take 1 tablet (50 mg) by mouth daily    Encounter for medication refill  -     acetaminophen (TYLENOL) 500 MG tablet; [ACETAMINOPHEN (MAPAP EXTRA STRENGTH) 500 MG TABLET] TAKE 1 TABLET (500 MG TOTAL)  BY MOUTH 2 (TWO) TIMES A DAY AS NEEDED FOR PAIN.    Other orders  -     REVIEW OF HEALTH MAINTENANCE PROTOCOL ORDERS  -     INFLUENZA VACCINE >6 MONTHS (AFLURIA/FLUZONE)  -     PRIMARY CARE FOLLOW-UP SCHEDULING; Future      Subjective   Pa is a 58 year old, presenting for the following health issues:  Diabetes and Gyn Exam        10/2/2023     8:48 AM   Additional Questions   Roomed by Ariana TORRES       History of Present Illness       Diabetes:   She presents for follow up of diabetes.  She is checking home blood glucose a few times a week.   She checks blood glucose before and after meals.  Blood glucose is never over 200 and sometimes under 70. She is aware of hypoglycemia symptoms including weakness and lethargy.    She has no concerns regarding her diabetes at this time.   She is not experiencing numbness or burning in feet, excessive thirst, blurry vision, weight changes or redness, sores or blisters on feet.         "  Dry skin, dry mouth- atorvastatin- she stopped taking this and her symptoms resolved completely. She is not interested in trying a lower dose.       Taking other medications regularly without concerns.     Working in [employer]. Occasionally will have blood sugar 50-60's at work when she skips meals, but she drinks milk and it resolves.        Review of Systems         Objective    BP (!) 86/58   Pulse 57   Temp 97.6  F (36.4  C) (Temporal)   Resp 16   Ht 1.499 m (4' 11\")   Wt 57.4 kg (126 lb 8 oz)   LMP  (LMP Unknown)   SpO2 98%   BMI 25.55 kg/m    Body mass index is 25.55 kg/m .  Wt Readings from Last 3 Encounters:   10/02/23 57.4 kg (126 lb 8 oz)   06/26/23 57.2 kg (126 lb 0.6 oz)   03/20/23 58.6 kg (129 lb 4 oz)     Physical Exam   General: Alert, NAD  Eyes: PERRL, EOMI, sclera normal.  HENT: Normocephalic, no pharyngeal erythema, MMM.  Neck: Supple, no adenopathy.  Heart: Normal S1 and S2, regular rhythm. No murmurs, gallops, rubs.  Lungs: CTA bilaterally, no wheezes, no crackles, no rhonchi.  Abdomen: Soft, non-tender, non-distended, BS+   Neuro: No focal deficits noted.  Diabetic foot exam: normal DP and PT pulses, no trophic changes or ulcerative lesions, normal sensory exam, and normal monofilament exam  Skin: Warm and well perfused, no rashes noted  Psych: Normal mood and affect                "

## 2023-11-06 ENCOUNTER — PATIENT OUTREACH (OUTPATIENT)
Dept: CARE COORDINATION | Facility: CLINIC | Age: 58
End: 2023-11-06
Payer: COMMERCIAL

## 2023-11-17 NOTE — PROGRESS NOTES
Pt aaox4.  Bed in low and locked position.  Nonskid socks in use.  Call bell, phone, food, drink within reach in bed or on bedside table.  Mother at bedside.  Both aware to call if needing assistance.  pain to mouth with dental abscesses - prn pain meds given 2x this shift.  Accuchecks ac/hs.  LR dc'd and NS @ 75cc/hr started.  Ganc Q12 IVPB and unasyn Q6hrs.   continues to have loose BMs.   ID  on board.  L piv x2.  R ++.  CT of maxillofacial and kidney us completed this am. See flowsheet for full assessment and details    Assessment & Plan     Poorly controlled type 2 diabetes mellitus (H)  Change med  Stop metformin XR   Start Janumet twice daily with meals  Declines DM ED  RTC fin 3 months  - Hemoglobin A1c  - blood glucose monitoring (NO BRAND SPECIFIED) meter device kit  Dispense: 1 kit; Refill: 0  - sitagliptin-metFORMIN (JANUMET)  MG tablet  Dispense: 60 tablet; Refill: 4    Language barrier      Poverty        Review of external notes as documented elsewhere in note  Ordering of each unique test  Prescription drug management  30 minutes spent on the date of the encounter doing chart review, history and exam, documentation and further activities per the note    Follow up in 3 months    Jasmyne Moctezuma MD  [facility] [place] professional phone  used.     Marco Khan is a 56 year old female who presents today for the following   health issues: DM       Diabetes Mellitis Type 2  A1c increased from 7.0 on 6/17/19  to 7.6 on 10/3/19 to 8.4 on 1/30/20 -> 8.8 on 12/14/20 -> 8.3 on 3/15/21 -> 8.5 6/17/21 -> 9.6 today  Glucometer not working so not checking   Last visit, changed metformin XR 750mg x 2 tabs (b/c not taking the 1000 BID and only daily) She is taking it at bedtimes- encouraged to take with meals   BP normal   Microalbuminuria - normal on on 10/3/19 and 12/14/20 on losartan -    Eye exam on 8/303/19 at [facility], she had been waiting for pandemic settle down. She reports eye clinic appt last month in Oct 2021  CMP wnl 10/2019 and 12/14/20  LDL 98 on 10/3/19 - > 97 on 12/14/20  DM ED referral ordered 7/15/21 but declined     She is feeling lower energy and lower mood that she associates with maybe her blood sugars        Colon cancer screening  Tubular adenoma of colon on colonscopy 7/2016 - repeat in 5 years  She cannot fit colonoscopy into her schedule, so agrees to FOBT for screening and will do colonoscopy next year when she has PTO.   FOBT neg  "6/23/21    Polypharmacy -   Forgot pill bottles    Vaccines -   Influenza at [employer]   Declines covid shot      Review of Systems     Please see above.  The rest of the review of systems are negative for all systems.    Current Outpatient Medications   Medication Instructions     ACCU-CHEK GEORGES PLUS TEST STRP strips [ACCU-CHEK GEORGES PLUS TEST STRP STRIPS] TEST 2 TIMES A DAY     acetaminophen (MAPAP EXTRA STRENGTH) 500 MG tablet [ACETAMINOPHEN (MAPAP EXTRA STRENGTH) 500 MG TABLET] TAKE 1 TABLET (500 MG TOTAL)  BY MOUTH 2 (TWO) TIMES A DAY AS NEEDED FOR PAIN.     generic lancets (ACCU-CHEK SOFTCLIX LANCETS) [GENERIC LANCETS (ACCU-CHEK SOFTCLIX LANCETS)] USE TO TEST 2 TIMES A DAY     losartan (COZAAR) 100 mg, Oral, DAILY     metFORMIN (GLUCOPHAGE-XR) 1,500 mg, Oral, DAILY     ONETOUCH DELICA LANCETS 33 gauge Misc [ONETOUCH DELICA LANCETS 33 GAUGE MISC] Test twice daily         Objective   Vitals:    10/25/21 0859   BP: 104/62   Pulse: 57   Resp: 17   Temp: 98.1  F (36.7  C)   TempSrc: Oral   SpO2: 98%   Weight: 59.6 kg (131 lb 8 oz)   Height: 1.499 m (4' 11\")       Body mass index is 26.56 kg/m .    Physical Exam    OBJECTIVE:  Vitals listed above within normal limits  General appearance: well groomed, pleasant, well hydrated, nontoxic appearing  ENT: PERRL, throat clear  Neck: neck supple, no lymphadenopathy, no thyromegaly  Lungs: lungs clear to auscultation bilaterally, no wheezes or rhonchi  Heart: regular rate and rhythm, no murmurs, rubs or gallops  Abdomen: soft, nontender  Neuro: no focal deficits, CN II-XII grossly intact, alert and oriented  Psych:  mood stable, appears to have good insight and judgment    Recent Results (from the past 1008 hour(s))   Hemoglobin A1c    Collection Time: 10/25/21  9:00 AM   Result Value Ref Range    Hemoglobin A1C 9.6 (H) 0.0 - 5.6 %              "

## 2023-12-04 ENCOUNTER — PATIENT OUTREACH (OUTPATIENT)
Dept: CARE COORDINATION | Facility: CLINIC | Age: 58
End: 2023-12-04
Payer: COMMERCIAL

## 2024-01-10 ENCOUNTER — PATIENT OUTREACH (OUTPATIENT)
Dept: GASTROENTEROLOGY | Facility: CLINIC | Age: 59
End: 2024-01-10
Payer: COMMERCIAL

## 2024-01-10 DIAGNOSIS — Z12.11 SPECIAL SCREENING FOR MALIGNANT NEOPLASMS, COLON: Primary | ICD-10-CM

## 2024-01-10 NOTE — LETTER
January 10, 2024      Marco Khan  [address] [place] [address]              Alek Lara,    We hope this letter finds you doing well.     Your health is important to us at [facility]. Our records indicate that you could be due, or possibly overdue, for a screening or surveillance colonoscopy if you have not had a colonoscopy since 2016.     An order has been placed for you to have this completed. Our scheduling team will be reaching out to you to assist in getting this scheduled. If you do not hear from them within 7 days or you would like to schedule sooner, please call [phone], option 2 for endoscopy scheduling.     If you believe this is in error and have already had a colonoscopy done, please have your results and recommendations faxed to [phone].    If you have questions about this order or have further concerns, please reach out to your primary care provider.     Jamar     Colorectal Cancer RN Screening Team  [facility]

## 2024-01-10 NOTE — PROGRESS NOTES
"Hx adenomatous polyps with 5 yr recall recommended on last colonoscopy performed in 2016    CRC Screening Colonoscopy Referral Review    Patient meets the inclusion criteria for screening colonoscopy standing order.    Ordering/Referring Provider:  Taylor De Guzman     BMI: Estimated body mass index is 25.55 kg/m  as calculated from the following:    Height as of 10/2/23: 1.499 m (4' 11\").    Weight as of 10/2/23: 57.4 kg (126 lb 8 oz).     Sedation:  Does patient have any of the following conditions affecting sedation?  No medical conditions affecting sedation.    Previous Scopes:  Any previous recommendations or follow up needs based on previous scope?  na / No recommendations.    Medical Concerns to Postpone Order:  Does patient have any of the following medical concerns that should postpone/delay colonoscopy referral?  No medical conditions affecting colonoscopy referral.    Final Referral Details:  Based on patient's medical history patient is appropriate for referral order with moderate sedation. If patient's BMI > 50 do not schedule in ASC.  "

## 2024-01-24 DIAGNOSIS — E11.9 TYPE 2 DIABETES MELLITUS WITHOUT COMPLICATION, WITHOUT LONG-TERM CURRENT USE OF INSULIN (H): Primary | ICD-10-CM

## 2024-01-25 ENCOUNTER — ANCILLARY PROCEDURE (OUTPATIENT)
Dept: MAMMOGRAPHY | Facility: CLINIC | Age: 59
End: 2024-01-25
Attending: FAMILY MEDICINE
Payer: COMMERCIAL

## 2024-01-25 ENCOUNTER — OFFICE VISIT (OUTPATIENT)
Dept: FAMILY MEDICINE | Facility: CLINIC | Age: 59
End: 2024-01-25
Attending: FAMILY MEDICINE
Payer: COMMERCIAL

## 2024-01-25 VITALS
SYSTOLIC BLOOD PRESSURE: 113 MMHG | RESPIRATION RATE: 16 BRPM | BODY MASS INDEX: 26.13 KG/M2 | TEMPERATURE: 97.6 F | HEIGHT: 59 IN | DIASTOLIC BLOOD PRESSURE: 76 MMHG | WEIGHT: 129.6 LBS | HEART RATE: 62 BPM | OXYGEN SATURATION: 99 %

## 2024-01-25 DIAGNOSIS — Z12.31 VISIT FOR SCREENING MAMMOGRAM: ICD-10-CM

## 2024-01-25 DIAGNOSIS — E11.9 TYPE 2 DIABETES MELLITUS WITHOUT COMPLICATION, WITHOUT LONG-TERM CURRENT USE OF INSULIN (H): Primary | ICD-10-CM

## 2024-01-25 DIAGNOSIS — R21 RASH: ICD-10-CM

## 2024-01-25 LAB — HBA1C MFR BLD: 8.2 % (ref 0–5.6)

## 2024-01-25 PROCEDURE — 99214 OFFICE O/P EST MOD 30 MIN: CPT | Performed by: FAMILY MEDICINE

## 2024-01-25 PROCEDURE — 83036 HEMOGLOBIN GLYCOSYLATED A1C: CPT | Performed by: FAMILY MEDICINE

## 2024-01-25 PROCEDURE — 77067 SCR MAMMO BI INCL CAD: CPT | Mod: TC | Performed by: RADIOLOGY

## 2024-01-25 PROCEDURE — 36415 COLL VENOUS BLD VENIPUNCTURE: CPT | Performed by: FAMILY MEDICINE

## 2024-01-25 RX ORDER — TRIAMCINOLONE ACETONIDE 1 MG/G
CREAM TOPICAL 2 TIMES DAILY
Qty: 80 G | Refills: 0 | Status: SHIPPED | OUTPATIENT
Start: 2024-01-25

## 2024-01-25 RX ORDER — ATORVASTATIN CALCIUM 20 MG/1
20 TABLET, FILM COATED ORAL DAILY
Qty: 90 TABLET | Refills: 3 | Status: SHIPPED | OUTPATIENT
Start: 2024-01-25 | End: 2024-09-11

## 2024-01-25 NOTE — LETTER
January 25, 2024      Marco Khan  [address] [place] [address]        To Whom It May Concern:    Marco Khan  was seen on 1/25/24.  She will be late to work today due to routine appointment.        Sincerely,        Taylor De Guzman MD    Electronically signed 1/25/24 at 12:45PM

## 2024-01-25 NOTE — PROGRESS NOTES
Pa was seen today for diabetes.    Diagnoses and all orders for this visit:    Type 2 diabetes mellitus without complication, without long-term current use of insulin (H): A1C not at goal- she does have some borderline lower BS at work (90's), so I want to avoid sulfonylurea. Could consider this another time if blood sugars allow. Will work on lifestyle modifications and portion sizes, and also patient has been missing doses, so she plans to purchase a pill box at the pharmacy. Added back her statin- unclear why this was not on her medication list anymore. Reviewed reasoning behind statin for cardiovascular risk reduction.  -     Hemoglobin A1c  -     atorvastatin (LIPITOR) 20 MG tablet; Take 1 tablet (20 mg) by mouth daily  -     sitagliptin-metFORMIN (JANUMET)  MG tablet; Take 1 tablet by mouth 2 times daily (with meals)  -     empagliflozin (JARDIANCE) 25 MG TABS tablet; Take 1 tablet (25 mg) by mouth daily    Visit for screening mammogram  -     MA SCREENING DIGITAL BILAT - Future  (s+30); Future    Rash: On right side of her back/flank. Nonpainful. Non blistering. Appears consistent with dermatitis- pruritus. No clear inciting factors. Will treat with topical steroid. If no improvement, return to clinic.  -     triamcinolone (KENALOG) 0.1 % external cream; Apply topically 2 times daily    Other orders  -     PRIMARY CARE FOLLOW-UP SCHEDULING  -     Extra Tube  -     PRIMARY CARE FOLLOW-UP SCHEDULING; Future          Subjective   Pa is a 58 year old, presenting for the following health issues:  Diabetes      1/25/2024    12:14 PM   Additional Questions   Roomed by Ann GOMEZ MA   Accompanied by SELF     Via the Health Maintenance questionnaire, the patient has reported the following services have been completed -Eye Exam, this information has been sent to the abstraction team.  History of Present Illness       Diabetes:   She presents for follow up of diabetes.  She is checking home blood glucose a few  "times a week.   She checks blood glucose before meals and after meals.  Blood glucose is never over 200 and never under 70. She is aware of hypoglycemia symptoms including shakiness, dizziness, weakness, lethargy, blurred vision and confusion.    She has no concerns regarding her diabetes at this time.   She is not experiencing numbness or burning in feet, excessive thirst, blurry vision, weight changes or redness, sores or blisters on feet. The patient has had a diabetic eye exam in the last 12 months. Eye exam performed on 2023. Location of last eye exam eyes clinic on [facility].       Does endorse missing doses- she sometimes cannot remember if she took or not  She works and often will take her medications at night- if she feels like she has forgotten  Notices her sugars are much lower at work 90's- when she is more active  No blood sugars  She notices higher blood sugars when she is home and more sedentary and eating more food    Lisinopril- had side effects- that is why she is on losartan  Decreased dose last visit due to hypotension and doing well  Not yet due for repeat urine microalbumin    Rash- on her right back.   Itchy  1 week duration                    Objective    /76   Pulse 62   Temp 97.6  F (36.4  C) (Temporal)   Resp 16   Ht 1.499 m (4' 11\")   Wt 58.8 kg (129 lb 9.6 oz)   LMP  (LMP Unknown)   SpO2 99%   BMI 26.18 kg/m    Body mass index is 26.18 kg/m .  Physical Exam   Gen: well appearing, no distress  Skin: R flank/back with erythematous rash        Signed Electronically by: Taylor De Guzman MD    "

## 2024-09-03 ENCOUNTER — PATIENT OUTREACH (OUTPATIENT)
Dept: CARE COORDINATION | Facility: CLINIC | Age: 59
End: 2024-09-03
Payer: COMMERCIAL

## 2024-09-10 DIAGNOSIS — E11.9 TYPE 2 DIABETES MELLITUS WITHOUT COMPLICATION, WITHOUT LONG-TERM CURRENT USE OF INSULIN (H): Primary | ICD-10-CM

## 2024-09-11 ENCOUNTER — OFFICE VISIT (OUTPATIENT)
Dept: FAMILY MEDICINE | Facility: CLINIC | Age: 59
End: 2024-09-11
Payer: COMMERCIAL

## 2024-09-11 VITALS
WEIGHT: 120 LBS | OXYGEN SATURATION: 97 % | BODY MASS INDEX: 24.19 KG/M2 | SYSTOLIC BLOOD PRESSURE: 94 MMHG | DIASTOLIC BLOOD PRESSURE: 55 MMHG | RESPIRATION RATE: 16 BRPM | TEMPERATURE: 98.2 F | HEART RATE: 66 BPM | HEIGHT: 59 IN

## 2024-09-11 DIAGNOSIS — R21 RASH: ICD-10-CM

## 2024-09-11 DIAGNOSIS — E11.9 TYPE 2 DIABETES MELLITUS WITHOUT COMPLICATION, WITHOUT LONG-TERM CURRENT USE OF INSULIN (H): Primary | ICD-10-CM

## 2024-09-11 DIAGNOSIS — Z12.4 CERVICAL CANCER SCREENING: ICD-10-CM

## 2024-09-11 DIAGNOSIS — D12.6 TUBULAR ADENOMA OF COLON: ICD-10-CM

## 2024-09-11 LAB
ERYTHROCYTE [DISTWIDTH] IN BLOOD BY AUTOMATED COUNT: 12.5 % (ref 10–15)
HBA1C MFR BLD: 13.4 % (ref 0–5.6)
HCT VFR BLD AUTO: 38.7 % (ref 35–47)
HGB BLD-MCNC: 13.9 G/DL (ref 11.7–15.7)
MCH RBC QN AUTO: 28.8 PG (ref 26.5–33)
MCHC RBC AUTO-ENTMCNC: 35.9 G/DL (ref 31.5–36.5)
MCV RBC AUTO: 80 FL (ref 78–100)
PLATELET # BLD AUTO: 171 10E3/UL (ref 150–450)
RBC # BLD AUTO: 4.83 10E6/UL (ref 3.8–5.2)
WBC # BLD AUTO: 5.7 10E3/UL (ref 4–11)

## 2024-09-11 PROCEDURE — 87624 HPV HI-RISK TYP POOLED RSLT: CPT | Performed by: FAMILY MEDICINE

## 2024-09-11 PROCEDURE — 36415 COLL VENOUS BLD VENIPUNCTURE: CPT | Performed by: FAMILY MEDICINE

## 2024-09-11 PROCEDURE — 80053 COMPREHEN METABOLIC PANEL: CPT | Performed by: FAMILY MEDICINE

## 2024-09-11 PROCEDURE — 90673 RIV3 VACCINE NO PRESERV IM: CPT | Performed by: FAMILY MEDICINE

## 2024-09-11 PROCEDURE — 80061 LIPID PANEL: CPT | Performed by: FAMILY MEDICINE

## 2024-09-11 PROCEDURE — 90471 IMMUNIZATION ADMIN: CPT | Performed by: FAMILY MEDICINE

## 2024-09-11 PROCEDURE — G0145 SCR C/V CYTO,THINLAYER,RESCR: HCPCS | Performed by: FAMILY MEDICINE

## 2024-09-11 PROCEDURE — 85027 COMPLETE CBC AUTOMATED: CPT | Performed by: FAMILY MEDICINE

## 2024-09-11 PROCEDURE — 83036 HEMOGLOBIN GLYCOSYLATED A1C: CPT | Performed by: FAMILY MEDICINE

## 2024-09-11 PROCEDURE — 99214 OFFICE O/P EST MOD 30 MIN: CPT | Mod: 25 | Performed by: FAMILY MEDICINE

## 2024-09-11 RX ORDER — DIPHENHYDRAMINE HCL 25 MG
25 TABLET ORAL EVERY 6 HOURS PRN
Qty: 60 TABLET | Refills: 1 | Status: SHIPPED | OUTPATIENT
Start: 2024-09-11

## 2024-09-11 RX ORDER — ATORVASTATIN CALCIUM 20 MG/1
20 TABLET, FILM COATED ORAL DAILY
Qty: 90 TABLET | Refills: 3 | Status: SHIPPED | OUTPATIENT
Start: 2024-09-11

## 2024-09-11 RX ORDER — TRIAMCINOLONE ACETONIDE 1 MG/G
OINTMENT TOPICAL 2 TIMES DAILY
Qty: 80 G | Refills: 3 | Status: SHIPPED | OUTPATIENT
Start: 2024-09-11

## 2024-09-11 RX ORDER — METFORMIN HCL 500 MG
1000 TABLET, EXTENDED RELEASE 24 HR ORAL 2 TIMES DAILY WITH MEALS
Qty: 360 TABLET | Refills: 3 | Status: SHIPPED | OUTPATIENT
Start: 2024-09-11

## 2024-09-11 RX ORDER — LOSARTAN POTASSIUM 50 MG/1
50 TABLET ORAL DAILY
Qty: 90 TABLET | Refills: 3 | Status: SHIPPED | OUTPATIENT
Start: 2024-09-11

## 2024-09-11 NOTE — PROGRESS NOTES
Assessment & Plan     Type 2 diabetes mellitus without complication, without long-term current use of insulin (H): A1C not at goal- significantly elevated due to insurance lapse and being out of her medications. Unfortunately, it appears her janumet is no longer covered by insurance and neither is januvia. Will restart metformin and jardiance and follow-up in 3 months- may need to add additional agent depending on A1C. On ARB for renal protection due to side effects to lisinopril.   - Comprehensive metabolic panel (BMP + Alb, Alk Phos, ALT, AST, Total. Bili, TP)  - CBC with platelets  - Hemoglobin A1c  - Lipid panel reflex to direct LDL Non-fasting  - Adult Eye  Referral  - atorvastatin (LIPITOR) 20 MG tablet  Dispense: 90 tablet; Refill: 3  - empagliflozin (JARDIANCE) 25 MG TABS tablet  Dispense: 90 tablet; Refill: 3  - losartan (COZAAR) 50 MG tablet  Dispense: 90 tablet; Refill: 3  - metFORMIN (GLUCOPHAGE XR) 500 MG 24 hr tablet  Dispense: 360 tablet; Refill: 3    Cervical cancer screening: Pap today.  - HPV and Gynecologic Cytology Panel - Recommended Age 30-65 Years    Tubular adenoma of colon on colonscopy 7/2016 - repeat in 5 years:   - Colonoscopy Screening  Referral    Rash: Unclear etiology- improved previously with steroid cream. Use benadryl prn for pruritus.  - diphenhydrAMINE (BENADRYL) 25 MG tablet  Dispense: 60 tablet; Refill: 1  - triamcinolone (KENALOG) 0.1 % external ointment  Dispense: 80 g; Refill: 3      The longitudinal plan of care for the diagnosis(es)/condition(s) as documented were addressed during this visit. Due to the added complexity in care, I will continue to support Pa in the subsequent management and with ongoing continuity of care.            Subjective   Pa is a 59 year old, presenting for the following health issues:  Derm Problem and follow up  (Dm )      9/11/2024     2:04 PM   Additional Questions   Roomed by monica livingston   Accompanied by Self     History of  "Present Illness       Reason for visit:  Follow up dm    She eats 0-1 servings of fruits and vegetables daily.She consumes 0 sweetened beverage(s) daily.She exercises with enough effort to increase her heart rate 9 or less minutes per day.  She exercises with enough effort to increase her heart rate 3 or less days per week.   She is taking medications regularly.     Has been out of her medications for 9 months- since January due to medical insurance lapse  She has reapplied and now has active insurance  She needs refills on medications sent to [place] pharmacy since she moved there in spring  She has rash on right leg and back- itchy. No new soaps, detergents.   Due for pap smear      Objective    BP 94/55 (BP Location: Left arm, Patient Position: Sitting, Cuff Size: Adult Regular)   Pulse 66   Temp 98.2  F (36.8  C) (Oral)   Resp 16   Ht 1.499 m (4' 11\")   Wt 54.4 kg (120 lb)   LMP  (LMP Unknown)   SpO2 97%   BMI 24.24 kg/m    Body mass index is 24.24 kg/m .  Physical Exam   Gen: well appearing   (female): External genitalia is without lesions. Introitus is normal, vaginal walls pink and moist without lesions or evidence of trauma.  No cervical lesions or discharge  Skin: erythematous maculopapular rash with excoriations on lower extremity and back          Signed Electronically by: Taylor De Guzman MD    "

## 2024-09-11 NOTE — LETTER
September 11, 2024      Marco Khan  [address] [address] [address]        To Whom It May Concern:    Marco Khan was seen in our clinic. Due to medical conditions, it is my medical recommendation that she work a maximum of 36 hours per week.       Sincerely,      Taylor De Guzman        Electronically signed 9/1/24 at 2:47PM

## 2024-09-12 ENCOUNTER — TELEPHONE (OUTPATIENT)
Dept: NURSING | Facility: CLINIC | Age: 59
End: 2024-09-12
Payer: COMMERCIAL

## 2024-09-12 LAB
ALBUMIN SERPL BCG-MCNC: 4.1 G/DL (ref 3.5–5.2)
ALP SERPL-CCNC: 70 U/L (ref 40–150)
ALT SERPL W P-5'-P-CCNC: 34 U/L (ref 0–50)
ANION GAP SERPL CALCULATED.3IONS-SCNC: 12 MMOL/L (ref 7–15)
AST SERPL W P-5'-P-CCNC: 12 U/L (ref 0–45)
BILIRUB SERPL-MCNC: 0.6 MG/DL
BUN SERPL-MCNC: 16 MG/DL (ref 8–23)
CALCIUM SERPL-MCNC: 8.9 MG/DL (ref 8.8–10.4)
CHLORIDE SERPL-SCNC: 94 MMOL/L (ref 98–107)
CHOLEST SERPL-MCNC: 190 MG/DL
CREAT SERPL-MCNC: 0.61 MG/DL (ref 0.51–0.95)
EGFRCR SERPLBLD CKD-EPI 2021: >90 ML/MIN/1.73M2
FASTING STATUS PATIENT QL REPORTED: NO
FASTING STATUS PATIENT QL REPORTED: NO
GLUCOSE SERPL-MCNC: 687 MG/DL (ref 70–99)
HCO3 SERPL-SCNC: 23 MMOL/L (ref 22–29)
HDLC SERPL-MCNC: 35 MG/DL
HPV HR 12 DNA CVX QL NAA+PROBE: NEGATIVE
HPV16 DNA CVX QL NAA+PROBE: NEGATIVE
HPV18 DNA CVX QL NAA+PROBE: NEGATIVE
HUMAN PAPILLOMA VIRUS FINAL DIAGNOSIS: NORMAL
LDLC SERPL CALC-MCNC: 85 MG/DL
NONHDLC SERPL-MCNC: 155 MG/DL
POTASSIUM SERPL-SCNC: 4.1 MMOL/L (ref 3.4–5.3)
PROT SERPL-MCNC: 6.5 G/DL (ref 6.4–8.3)
SODIUM SERPL-SCNC: 129 MMOL/L (ref 135–145)
TRIGL SERPL-MCNC: 351 MG/DL

## 2024-09-12 NOTE — TELEPHONE ENCOUNTER
Patient had been out of medications due to insurance lapse. I saw her in clinic yesterday and she appeared well- no signs of DKA     Please call back patient and make sure she is hydrating adequately. Medications were sent to pharmacy yesterday- please make sure she was able to get these. If she develops any nausea or vomiting, she would need to be seen/evaluated.    Taylor De Guzman MD

## 2024-09-12 NOTE — TELEPHONE ENCOUNTER
DATE/TIME OF CALL RECEIVED FROM LAB:  09/12/24 at 5:01 AM   LAB TEST:  glucose    LAB VALUE:  687  PROVIDER NOTIFIED?: Yes  PROVIDER NAME: Dr. Ragland   DATE/TIME LAB VALUE REPORTED TO PROVIDER: 0529  MECHANISM OF PROVIDER NOTIFICATION: Phone Call, initial page sent out at 0508. No response from provider. Writer notified answering service who connected with MD.   PROVIDER RESPONSE: update PCP    Lab collected 9/11/24 [id number]

## 2024-09-12 NOTE — TELEPHONE ENCOUNTER
"Writer attempt #1 to call patient with the help of a \"Valorie\"  regarding clinician's message below. No answer, left non-detailed voicemail, with clinic call back number.     If patient calls back, please relay clinician's message to them. Thanks.    Juan Carlos De Anda, HANNAHN, RN   [facility]    "

## 2024-09-13 NOTE — TELEPHONE ENCOUNTER
"Writer attempt #2 to call patient with the help of a \"Valorie\"  regarding clinician's message below. No answer, left non-detailed voicemail, with clinic call back number.      If patient calls back, please relay clinician's message to them. Thanks.     Juan Carlos De Anda, HANNAHN, RN              [facility]  "

## 2024-09-17 LAB
BKR AP ASSOCIATED HPV REPORT: NORMAL
BKR LAB AP GYN ADEQUACY: NORMAL
BKR LAB AP GYN INTERPRETATION: NORMAL
BKR LAB AP PREVIOUS ABNORMAL: NORMAL
PATH REPORT.COMMENTS IMP SPEC: NORMAL
PATH REPORT.COMMENTS IMP SPEC: NORMAL
PATH REPORT.RELEVANT HX SPEC: NORMAL

## 2024-09-18 NOTE — TELEPHONE ENCOUNTER
Attempt #3 to call patient and left message to call clinic back. Per protocol, max attempt reached.       - writer called pharmacy to confirm if patient was able to  Rx. Pharmacy confirmed that patient picked up all Rx except for losartan as it was too soon to fill. Ready for  on 9/21.      How would PCP like to proceed?      Raudel Allen Cem Say, BSN RN  [facility]

## 2024-09-30 ENCOUNTER — TRANSFERRED RECORDS (OUTPATIENT)
Dept: HEALTH INFORMATION MANAGEMENT | Facility: CLINIC | Age: 59
End: 2024-09-30
Payer: COMMERCIAL

## 2024-12-11 DIAGNOSIS — E11.9 TYPE 2 DIABETES MELLITUS WITHOUT COMPLICATION, WITHOUT LONG-TERM CURRENT USE OF INSULIN (H): Primary | ICD-10-CM

## 2024-12-12 ENCOUNTER — OFFICE VISIT (OUTPATIENT)
Dept: FAMILY MEDICINE | Facility: CLINIC | Age: 59
End: 2024-12-12
Payer: COMMERCIAL

## 2024-12-12 VITALS
OXYGEN SATURATION: 99 % | SYSTOLIC BLOOD PRESSURE: 128 MMHG | BODY MASS INDEX: 23.93 KG/M2 | WEIGHT: 121.9 LBS | TEMPERATURE: 97.9 F | RESPIRATION RATE: 16 BRPM | HEIGHT: 60 IN | DIASTOLIC BLOOD PRESSURE: 80 MMHG | HEART RATE: 59 BPM

## 2024-12-12 DIAGNOSIS — E11.9 TYPE 2 DIABETES MELLITUS WITHOUT COMPLICATION, WITHOUT LONG-TERM CURRENT USE OF INSULIN (H): ICD-10-CM

## 2024-12-12 DIAGNOSIS — Z86.0100 HISTORY OF COLONIC POLYPS: ICD-10-CM

## 2024-12-12 DIAGNOSIS — Z76.0 ENCOUNTER FOR MEDICATION REFILL: ICD-10-CM

## 2024-12-12 DIAGNOSIS — Z00.00 ROUTINE GENERAL MEDICAL EXAMINATION AT A HEALTH CARE FACILITY: Primary | ICD-10-CM

## 2024-12-12 LAB
EST. AVERAGE GLUCOSE BLD GHB EST-MCNC: 255 MG/DL
HBA1C MFR BLD: 10.5 % (ref 0–5.6)

## 2024-12-12 PROCEDURE — 36415 COLL VENOUS BLD VENIPUNCTURE: CPT | Performed by: FAMILY MEDICINE

## 2024-12-12 PROCEDURE — 99213 OFFICE O/P EST LOW 20 MIN: CPT | Performed by: FAMILY MEDICINE

## 2024-12-12 PROCEDURE — 99396 PREV VISIT EST AGE 40-64: CPT | Performed by: FAMILY MEDICINE

## 2024-12-12 PROCEDURE — 83036 HEMOGLOBIN GLYCOSYLATED A1C: CPT | Performed by: FAMILY MEDICINE

## 2024-12-12 RX ORDER — ACETAMINOPHEN 500 MG
TABLET ORAL
Qty: 90 TABLET | Refills: 5 | Status: SHIPPED | OUTPATIENT
Start: 2024-12-12

## 2024-12-12 SDOH — HEALTH STABILITY: PHYSICAL HEALTH: ON AVERAGE, HOW MANY MINUTES DO YOU ENGAGE IN EXERCISE AT THIS LEVEL?: 20 MIN

## 2024-12-12 SDOH — HEALTH STABILITY: PHYSICAL HEALTH: ON AVERAGE, HOW MANY DAYS PER WEEK DO YOU ENGAGE IN MODERATE TO STRENUOUS EXERCISE (LIKE A BRISK WALK)?: 5 DAYS

## 2024-12-12 ASSESSMENT — SOCIAL DETERMINANTS OF HEALTH (SDOH): HOW OFTEN DO YOU GET TOGETHER WITH FRIENDS OR RELATIVES?: ONCE A WEEK

## 2024-12-12 NOTE — PROGRESS NOTES
Preventive Care Visit  [facility] GERI De Guzman MD, Family Medicine  Dec 12, 2024      Assessment & Plan     Routine general medical examination at a health care facility    Encounter for medication refill  - acetaminophen (TYLENOL) 500 MG tablet  Dispense: 90 tablet; Refill: 5    Type 2 diabetes mellitus without complication, without long-term current use of insulin (H): A1C not at goal. Per self reported blood sugars, fasting number significantly improved. No hypoglycemia. Continue metformin and jardiance. On ARB (ACE intolerance) and statin. Will need to add additional agent- will call patient to see if she is amenable to GLP-1 or sulfonylurea.   - Hemoglobin A1c    History of colonic polyps: Last colonoscopy 7/2016- recommendation to repeat in 5 years, due 2021. Referral placed.  - Colonoscopy Screening  Referral              Counseling  Appropriate preventive services were addressed with this patient via screening, questionnaire, or discussion as appropriate for fall prevention, nutrition, physical activity, Tobacco-use cessation, social engagement, weight loss and cognition.  Checklist reviewing preventive services available has been given to the patient.  Reviewed patient's diet, addressing concerns and/or questions.   The patient was instructed to see the dentist every 6 months.           Subjective   Pa is a 59 year old, presenting for the following:  Physical        12/12/2024     9:56 AM   Additional Questions   Roomed by monica livingston   Accompanied by Self          HPI    Taking medications regularly- does not miss doses  Forgot glucometer  Fasting BS- 130  PM- 120's  Working- very active lifting things at work  Works at [employer]  Needs letter for work    Health Care Directive  Patient does not have a Health Care Directive: not on file      12/12/2024   General Health   How would you rate your overall physical health? Good   Feel stress (tense, anxious, or unable to  sleep) Not at all            2024   Nutrition   Three or more servings of calcium each day? Yes   Diet: Regular (no restrictions)   How many servings of fruit and vegetables per day? (!) 2-3   How many sweetened beverages each day? 0-1            2024   Exercise   Days per week of moderate/strenous exercise 5 days   Average minutes spent exercising at this level 20 min            2024   Social Factors   Frequency of gathering with friends or relatives Once a week   Worry food won't last until get money to buy more No   Food not last or not have enough money for food? No   Do you have housing? (Housing is defined as stable permanent housing and does not include staying ouside in a car, in a tent, in an abandoned building, in an overnight shelter, or couch-surfing.) Yes   Are you worried about losing your housing? No   Lack of transportation? No   Unable to get utilities (heat,electricity)? No            2024   Fall Risk   Fallen 2 or more times in the past year? No    Trouble with walking or balance? No        Patient-reported          2024   Dental   Dentist two times every year? (!) NO            2024   TB Screening   Were you born outside of the [place]? Yes              Today's PHQ-2 Score:       2024    12:18 PM   PHQ-2 (  [employer])   Q1: Little interest or pleasure in doing things 0   Q2: Feeling down, depressed or hopeless 0   PHQ-2 Score 0         2024   Substance Use   Alcohol more than 3/day or more than 7/wk No   Do you use any other substances recreationally? No        Social History     Tobacco Use    Smoking status: Former     Current packs/day: 0.00     Types: Cigarettes     Quit date: 1998     Years since quittin.9     Passive exposure: Never    Smokeless tobacco: Former   Vaping Use    Vaping status: Never Used   Substance Use Topics    Alcohol use: No    Drug use: No           2024   LAST FHS-7 RESULTS   1st degree relative breast or ovarian  "cancer No   Any relative bilateral breast cancer No   Any male have breast cancer No   Any ONE woman have BOTH breast AND ovarian cancer No   Any woman with breast cancer before 50yrs No   2 or more relatives with breast AND/OR ovarian cancer No   2 or more relatives with breast AND/OR bowel cancer No                   12/12/2024   STI Screening   New sexual partner(s) since last STI/HIV test? (!) DECLINE        History of abnormal Pap smear:         Latest Ref Rng & Units 9/11/2024     2:28 PM 8/2/2018     2:33 PM   PAP / HPV   PAP  Negative for Intraepithelial Lesion or Malignancy (NILM)  Negative for squamous intraepithelial lesion or malignancy  Electronically signed by Ariana Wyman CT (ASCP) on 8/8/2018 at  2:08 PM      HPV 16 DNA Negative Negative  Negative    HPV 18 DNA Negative Negative  Negative    Other HR HPV Negative Negative  Negative      ASCVD Risk   The 10-year ASCVD risk score (Tera BARRIENTOS, et al., 2019) is: 10%    Values used to calculate the score:      Age: 59 years      Sex: Female      Is Non- : No      Diabetic: Yes      Tobacco smoker: No      Systolic Blood Pressure: 128 mmHg      Is BP treated: Yes      HDL Cholesterol: 35 mg/dL      Total Cholesterol: 190 mg/dL           Reviewed and updated as needed this visit by Provider                             Objective    Exam  /80   Pulse 59   Temp 97.9  F (36.6  C) (Oral)   Resp 16   Ht 1.518 m (4' 11.76\")   Wt 55.3 kg (121 lb 14.4 oz)   LMP  (LMP Unknown)   SpO2 99%   BMI 24.00 kg/m     Estimated body mass index is 24 kg/m  as calculated from the following:    Height as of this encounter: 1.518 m (4' 11.76\").    Weight as of this encounter: 55.3 kg (121 lb 14.4 oz).  Wt Readings from Last 3 Encounters:   12/12/24 55.3 kg (121 lb 14.4 oz)   09/11/24 54.4 kg (120 lb)   01/25/24 58.8 kg (129 lb 9.6 oz)         Physical Exam  GENERAL: alert and no distress  EYES: Eyes grossly normal to " inspection, PERRL and conjunctivae and sclerae normal  RESP: lungs clear to auscultation - no rales, rhonchi or wheezes  CV: regular rate and rhythm, normal S1 S2, no S3 or S4, no murmur, click or rub, no peripheral edema  PSYCH: mentation appears normal, affect normal/bright        Signed Electronically by: Taylor De Guzman MD

## 2024-12-12 NOTE — LETTER
December 12, 2024      Marco Khan  [address] [address] [address]        To Whom It May Concern:    Marco Khan was seen in our clinic. It is my recommendation that she work a maximum of 25 hours per week.       Sincerely,      Taylor De Guzman    Electronically signed 12/12/24

## 2024-12-12 NOTE — PATIENT INSTRUCTIONS
Patient Education   You have been provided the Preventive Care Advice document.    Additional copies can be found here: [email or web address]  Preventive Care Advice   This is general advice given by our system to help you stay healthy. However, your care team may have specific advice just for you. Please talk to your care team about your preventive care needs.  Nutrition  Eat 5 or more servings of fruits and vegetables each day.  Try wheat bread, brown rice and whole grain pasta (instead of white bread, rice, and pasta).  Get enough calcium and vitamin D. Check the label on foods and aim for 100% of the RDA (recommended daily allowance).  Lifestyle  Exercise at least 150 minutes each week  (30 minutes a day, 5 days a week).  Do muscle strengthening activities 2 days a week. These help control your weight and prevent disease.  No smoking.  Wear sunscreen to prevent skin cancer.  Have a dental exam and cleaning every 6 months.  Yearly exams  See your health care team every year to talk about:  Any changes in your health.  Any medicines your care team has prescribed.  Preventive care, family planning, and ways to prevent chronic diseases.  Shots (vaccines)   HPV shots (up to age 26), if you've never had them before.  Hepatitis B shots (up to age 59), if you've never had them before.  COVID-19 shot: Get this shot when it's due.  Flu shot: Get a flu shot every year.  Tetanus shot: Get a tetanus shot every 10 years.  Pneumococcal, hepatitis A, and RSV shots: Ask your care team if you need these based on your risk.  Shingles shot (for age 50 and up)  General health tests  Diabetes screening:  Starting at age 35, Get screened for diabetes at least every 3 years.  If you are younger than age 35, ask your care team if you should be screened for diabetes.  Cholesterol test: At age 39, start having a cholesterol test every 5 years, or more often if advised.  Bone density scan (DEXA): At age 50, ask your care team if you  should have this scan for osteoporosis (brittle bones).  Hepatitis C: Get tested at least once in your life.  STIs (sexually transmitted infections)  Before age 24: Ask your care team if you should be screened for STIs.  After age 24: Get screened for STIs if you're at risk. You are at risk for STIs (including HIV) if:  You are sexually active with more than one person.  You don't use condoms every time.  You or a partner was diagnosed with a sexually transmitted infection.  If you are at risk for HIV, ask about PrEP medicine to prevent HIV.  Get tested for HIV at least once in your life, whether you are at risk for HIV or not.  Cancer screening tests  Cervical cancer screening: If you have a cervix, begin getting regular cervical cancer screening tests starting at age 21.  Breast cancer scan (mammogram): If you've ever had breasts, begin having regular mammograms starting at age 40. This is a scan to check for breast cancer.  Colon cancer screening: It is important to start screening for colon cancer at age 45.  Have a colonoscopy test every 10 years (or more often if you're at risk) Or, ask your provider about stool tests like a FIT test every year or Cologuard test every 3 years.  To learn more about your testing options, visit:   .  For help making a decision, visit:   https://bit.ly/zk00547.  Prostate cancer screening test: If you have a prostate, ask your care team if a prostate cancer screening test (PSA) at age 55 is right for you.  Lung cancer screening: If you are a current or former smoker ages 50 to 80, ask your care team if ongoing lung cancer screenings are right for you.  For informational purposes only. Not to replace the advice of your health care provider. Copyright   2023 [employer]. All rights reserved. Clinically reviewed by the [facility]itions [facility]. [employer] [id number] - REV 01/24.

## 2024-12-15 PROBLEM — Z59.6 POVERTY: Status: RESOLVED | Noted: 2021-10-25 | Resolved: 2024-12-15

## 2024-12-16 ENCOUNTER — TELEPHONE (OUTPATIENT)
Dept: FAMILY MEDICINE | Facility: CLINIC | Age: 59
End: 2024-12-16
Payer: COMMERCIAL

## 2024-12-16 DIAGNOSIS — E11.9 TYPE 2 DIABETES MELLITUS WITHOUT COMPLICATION, WITHOUT LONG-TERM CURRENT USE OF INSULIN (H): Primary | ICD-10-CM

## 2024-12-16 RX ORDER — GLIPIZIDE 5 MG/1
5 TABLET, FILM COATED, EXTENDED RELEASE ORAL DAILY
Qty: 90 TABLET | Refills: 3 | Status: SHIPPED | OUTPATIENT
Start: 2024-12-16

## 2024-12-16 NOTE — TELEPHONE ENCOUNTER
----- Message from Taylor De Guzman sent at 12/16/2024  7:44 AM CST -----  Team - please call patient with results.  A1C is improved significantly from 13 to 10, but still elevated (goal <7)  I would like to add a daily pill- she should take this in the morning with breakfast (make sure to eat at least 3 small meals throughout the day to prevent hypoglycemia)  She should also monitor her diet and work on limiting portions of white rice (substitute brown rice if possible) and noodles and sugary beverages.  She should take her fasting sugars daily and let me know if she is having hypoglycemia (less than 70)

## 2024-12-16 NOTE — LETTER
"December 19, 2024      Marco Khan  [address] [place] [address]    Dear ,    We are writing to inform you of your test results.  Staff has made multiple attempts to reach you.  Please call the clinic to update your contacts.  Below are provider test result and recommendations.    \"A1C is improved significantly from 13 to 10, but still elevated (goal <7)  I would like to add a daily pill- she should take this in the morning with breakfast (make sure to eat at least 3 small meals throughout the day to prevent hypoglycemia)  She should also monitor her diet and work on limiting portions of white rice (substitute brown rice if possible) and noodles and sugary beverages.  She should take her fasting sugars daily and let me know if she is having hypoglycemia (less than 70)\"          Resulted Orders   Hemoglobin A1c   Result Value Ref Range    Estimated Average Glucose 255 (H) <117 mg/dL    Hemoglobin A1C 10.5 (H) 0.0 - 5.6 %      Comment:      Normal <5.7%   Prediabetes 5.7-6.4%    Diabetes 6.5% or higher     Note: Adopted from ADA consensus guidelines.    Narrative    Results confirmed by repeat test.        If you have any questions or concerns, please call the clinic at the number listed above.       Sincerely,        Taylor De Guzman MD/DAYO, RN          "

## 2024-12-17 NOTE — TELEPHONE ENCOUNTER
"Writer attempt #1 to call patient with the help of a \"Valorie\"    (ID# [id number]) regarding clinician's message below. Someone picked up call, but no one is talking. Unable to leave VM.     If patient calls back, please relay clinician's message to them. Thanks.    Juan Carlos De Anda, HANNAHN, RN, N   [facility]    "

## 2024-12-18 NOTE — TELEPHONE ENCOUNTER
"Writer attempt #2 to call patient with the help of a \"Valorie\"   (ID # [id number]) regarding clinician's message below. No answer, unable to leave VM.    If patient calls back, please relay clinician's message to them. Thanks.    Juan Carlos De Anda, HANNAHN, RN, PHN   [facility]    "

## 2024-12-19 NOTE — TELEPHONE ENCOUNTER
RN made contact to patient #3 with  Way today with no answer.  Call kept dropping.  Numbers belongs to spouse and son are also same as patient.  A result letter will be mailed to patient via address on file in standard mail per protocol after 3rd unsuccessful attempts were made.    Ventura Dotson RN  [facility]

## 2024-12-26 ENCOUNTER — PATIENT OUTREACH (OUTPATIENT)
Dept: CARE COORDINATION | Facility: CLINIC | Age: 59
End: 2024-12-26
Payer: COMMERCIAL

## 2025-01-22 ENCOUNTER — TELEPHONE (OUTPATIENT)
Dept: FAMILY MEDICINE | Facility: CLINIC | Age: 60
End: 2025-01-22
Payer: COMMERCIAL

## 2025-01-22 NOTE — TELEPHONE ENCOUNTER
Services. Valorie ID# [id number].   Unable to leave message. No answer.     Message:   Patient may need to schedule appointment with Dr. De Guzman to discuss request.     HANNAH HerronN RN  [facility]

## 2025-01-22 NOTE — TELEPHONE ENCOUNTER
Forms/Letter Request    Type of form/letter: OTHER: Patient feels she isn't ready to return to work yet and is requesting to have Dr. De Guzman to write a letter to her work stating she will return to work March 8th.       Do we have the form/letter: No    Who is the form from? Patient    Where did/will the form come from? Patient or family brought in       When is form/letter needed by: ASAP    How would you like the form/letter returned:     Patient Notified form requests are processed in 5-7 business days:Yes    Okay to leave a detailed message?: Yes at Other phone number:  [phone]

## 2025-01-23 NOTE — TELEPHONE ENCOUNTER
Called patient, informed patient that an extending work letter this long of a timeframe would need an office visit for assessment and evaluation by the doctor. Patient is in agreement. Appointment is scheduled for next Friday with PCP.       Sadiq Jorge, MSN, RN   [facility]

## 2025-01-31 ENCOUNTER — OFFICE VISIT (OUTPATIENT)
Dept: FAMILY MEDICINE | Facility: CLINIC | Age: 60
End: 2025-01-31
Payer: COMMERCIAL

## 2025-01-31 ENCOUNTER — ANCILLARY PROCEDURE (OUTPATIENT)
Dept: GENERAL RADIOLOGY | Facility: CLINIC | Age: 60
End: 2025-01-31
Attending: FAMILY MEDICINE
Payer: COMMERCIAL

## 2025-01-31 VITALS
WEIGHT: 124.25 LBS | OXYGEN SATURATION: 99 % | HEART RATE: 57 BPM | SYSTOLIC BLOOD PRESSURE: 126 MMHG | DIASTOLIC BLOOD PRESSURE: 78 MMHG | HEIGHT: 60 IN | TEMPERATURE: 97.9 F | RESPIRATION RATE: 16 BRPM | BODY MASS INDEX: 24.39 KG/M2

## 2025-01-31 DIAGNOSIS — E11.9 TYPE 2 DIABETES MELLITUS WITHOUT COMPLICATION, WITHOUT LONG-TERM CURRENT USE OF INSULIN (H): ICD-10-CM

## 2025-01-31 DIAGNOSIS — Z12.31 VISIT FOR SCREENING MAMMOGRAM: Primary | ICD-10-CM

## 2025-01-31 DIAGNOSIS — M25.511 CHRONIC RIGHT SHOULDER PAIN: ICD-10-CM

## 2025-01-31 DIAGNOSIS — G89.29 CHRONIC RIGHT SHOULDER PAIN: ICD-10-CM

## 2025-01-31 PROCEDURE — G2211 COMPLEX E/M VISIT ADD ON: HCPCS | Performed by: FAMILY MEDICINE

## 2025-01-31 PROCEDURE — 99214 OFFICE O/P EST MOD 30 MIN: CPT | Performed by: FAMILY MEDICINE

## 2025-01-31 PROCEDURE — 73030 X-RAY EXAM OF SHOULDER: CPT | Mod: TC | Performed by: STUDENT IN AN ORGANIZED HEALTH CARE EDUCATION/TRAINING PROGRAM

## 2025-01-31 RX ORDER — NAPROXEN 500 MG/1
500 TABLET ORAL DAILY PRN
Qty: 50 TABLET | Refills: 1 | Status: SHIPPED | OUTPATIENT
Start: 2025-01-31

## 2025-01-31 NOTE — PROGRESS NOTES
"  {PROVIDER CHARTING PREFERENCE:575004}    Subjective   Pa is a 59 year old, presenting for the following health issues:  Forms (Work note excusing her til 03/10) and Arm Pain      1/31/2025     9:06 AM   Additional Questions   Roomed by Vidya CASTELLANOS   Accompanied by self         1/31/2025   Forms   Any forms needing to be completed Yes     Arm Pain    History of Present Illness       Reason for visit:  Work not      R shoulder pain  Posterior  Started 2024  No known injury  Carrying heavy items or lifting exacerbates the symptoms  At work, she lifts things ~70 lbs for the duration of her shift  Works at [employer]        Objective    /78   Pulse 57   Temp 97.9  F (36.6  C) (Oral)   Resp 16   Ht 1.52 m (4' 11.84\")   Wt 56.4 kg (124 lb 4 oz)   LMP  (LMP Unknown)   SpO2 99%   BMI 24.39 kg/m    Body mass index is 24.39 kg/m .  Physical Exam   {Exam List (Optional):906952}    {Diagnostic Test Results (Optional):727860}        Signed Electronically by: Taylor De Guzman MD  {Email feedback regarding this note to [email or web address]   :[id number]}  "

## 2025-01-31 NOTE — LETTER
2025    Marco Khan   1965        To Whom it May Concern;    Marco Khan is under my care at [facility]. Due to right shoulder pain, it is my recommendation that patient return to work on 3/3/2025 with no restrictions.         Taylor De Guzman MD

## 2025-01-31 NOTE — LETTER
January 31, 2025      Marco Khan  [address] [address]        To Whom It May Concern:    Marco Khan was seen in our clinic. It is my medical recommendation that due to right shoulder pain, she remain off work, and return on 3/10/25 with no restrictions.      Sincerely,      Taylor De Guzman      Electronically signed

## 2025-02-03 ENCOUNTER — PATIENT OUTREACH (OUTPATIENT)
Dept: CARE COORDINATION | Facility: CLINIC | Age: 60
End: 2025-02-03
Payer: COMMERCIAL

## 2025-02-03 ENCOUNTER — TELEPHONE (OUTPATIENT)
Dept: FAMILY MEDICINE | Facility: CLINIC | Age: 60
End: 2025-02-03
Payer: COMMERCIAL

## 2025-02-03 NOTE — TELEPHONE ENCOUNTER
----- Message from Taylor De Guzman sent at 2/2/2025 12:32 PM CST -----  Team - please call patient with results.  Her shoulder x-ray was overall reassuring- no fractures.   She does have some mild arthritis that may be contributing to her symptoms. If she wants to do physical therapy, please let me know and I can place a referral.    Spoke to patient on the phone with  ID: [id number] to relay provider test result and recommendation above. Patient is not interested in physical therapy at this time. However, if does decided to pursue in the PT treatment, will update provider.  Task closed.    Ventura Dotson RN  [facility]

## 2025-02-28 ENCOUNTER — TELEPHONE (OUTPATIENT)
Dept: FAMILY MEDICINE | Facility: CLINIC | Age: 60
End: 2025-02-28
Payer: COMMERCIAL

## 2025-02-28 NOTE — TELEPHONE ENCOUNTER
Forms/Letter Request    Type of form/letter: FMLA - Unknown  Is Release of Information needed?: Yes  Was an TRUNG obtained?  Yes    Do we have the form/letter: Yes: Incoming fax bin for     Who is the form from? [employer] [facility]  (if other please explain)    Where did/will the form come from? form was faxed in    When is form/letter needed by: ASAP    How would you like the form/letter returned: Fax : [phone]

## 2025-06-12 ENCOUNTER — OFFICE VISIT (OUTPATIENT)
Dept: FAMILY MEDICINE | Facility: CLINIC | Age: 60
End: 2025-06-12
Payer: COMMERCIAL

## 2025-06-12 ENCOUNTER — RESULTS FOLLOW-UP (OUTPATIENT)
Dept: FAMILY MEDICINE | Facility: CLINIC | Age: 60
End: 2025-06-12

## 2025-06-12 VITALS
BODY MASS INDEX: 24.74 KG/M2 | OXYGEN SATURATION: 99 % | TEMPERATURE: 97.4 F | RESPIRATION RATE: 16 BRPM | DIASTOLIC BLOOD PRESSURE: 80 MMHG | HEART RATE: 56 BPM | SYSTOLIC BLOOD PRESSURE: 134 MMHG | WEIGHT: 126 LBS | HEIGHT: 60 IN

## 2025-06-12 DIAGNOSIS — M25.511 CHRONIC RIGHT SHOULDER PAIN: ICD-10-CM

## 2025-06-12 DIAGNOSIS — E11.9 TYPE 2 DIABETES MELLITUS WITHOUT COMPLICATION, WITHOUT LONG-TERM CURRENT USE OF INSULIN (H): Primary | ICD-10-CM

## 2025-06-12 DIAGNOSIS — D12.6 TUBULAR ADENOMA OF COLON: ICD-10-CM

## 2025-06-12 DIAGNOSIS — G89.29 CHRONIC RIGHT SHOULDER PAIN: ICD-10-CM

## 2025-06-12 LAB
EST. AVERAGE GLUCOSE BLD GHB EST-MCNC: 174 MG/DL
HBA1C MFR BLD: 7.7 % (ref 0–5.6)

## 2025-06-12 RX ORDER — NAPROXEN 500 MG/1
500 TABLET ORAL DAILY PRN
Qty: 50 TABLET | Refills: 1 | Status: SHIPPED | OUTPATIENT
Start: 2025-06-12

## 2025-06-12 NOTE — PROGRESS NOTES
Assessment & Plan     Type 2 diabetes mellitus without complication, without long-term current use of insulin (H): Doing better with medication compliance with metformin, empagliflozin and glipizide. Few episodes of hypoglycemia at work- reviewed how to correct. Patient has made significant dietary changes and has seen good improvement in her A1c. No adjustments in her medications today- continue dietary and activity modifications.  - Hemoglobin A1c    Chronic right shoulder pain: Able to work- her employer has been understanding and gives her breaks if she needs. Continue NSAID if needed- always take with food and avoid taking regularly. Declines referral for PT or further imaging at this time.  - naproxen (NAPROSYN) 500 MG tablet  Dispense: 50 tablet; Refill: 1    History of tubular adenoma on colonoscopy 2016- repeat colonoscopy in 5 years- she has been due- will place referral.                 Subjective   Pa is a 59 year old, presenting for the following health issues:  Follow up  (DM )      6/12/2025    10:39 AM   Additional Questions   Roomed by monica livingston   Accompanied by Self     History of Present Illness       Diabetes:   She presents for follow up of diabetes.  She is checking home blood glucose a few times a month.   She checks blood glucose before and after meals.  Blood glucose is never over 200 and never under 70. She is aware of hypoglycemia symptoms including shakiness, dizziness and weakness.    She has no concerns regarding her diabetes at this time.   She is not experiencing numbness or burning in feet, excessive thirst, blurry vision, weight changes or redness, sores or blisters on feet.           She eats 2-3 servings of fruits and vegetables daily.She consumes 0 sweetened beverage(s) daily.She exercises with enough effort to increase her heart rate 20 to 29 minutes per day.  She exercises with enough effort to increase her heart rate 4 days per week.   She is taking medications regularly.     "    Forgot her meter as she was running late today  Checks sugars in evenings, about 2 hours after eatin's  Has episodes of hypogycemia at work sometimes- 1-2 times per month  Lifting boxes at work, 20 lbs, above her head  She does still having R shoulder pain but it is improved  Went to [facility] and was usign topical medication  Needs refill on naproxen  She is able to do what she needs to at work- her employer is understanding of her situation  She has started eating more brown rice and vegetables- decreased white rice and meat.      Objective    /80   Pulse 56   Temp 97.4  F (36.3  C) (Temporal)   Resp 16   Ht 1.52 m (4' 11.84\")   Wt 57.2 kg (126 lb)   LMP  (LMP Unknown)   SpO2 99%   BMI 24.74 kg/m    Body mass index is 24.74 kg/m .  Physical Exam   Gen: well appearing, no distress          Signed Electronically by: Taylor De Guzman MD    "